# Patient Record
Sex: MALE | Race: BLACK OR AFRICAN AMERICAN | Employment: FULL TIME | URBAN - METROPOLITAN AREA
[De-identification: names, ages, dates, MRNs, and addresses within clinical notes are randomized per-mention and may not be internally consistent; named-entity substitution may affect disease eponyms.]

---

## 2021-03-18 ENCOUNTER — APPOINTMENT (OUTPATIENT)
Dept: GENERAL RADIOLOGY | Age: 56
DRG: 177 | End: 2021-03-18
Attending: EMERGENCY MEDICINE
Payer: COMMERCIAL

## 2021-03-18 ENCOUNTER — HOSPITAL ENCOUNTER (INPATIENT)
Age: 56
LOS: 3 days | Discharge: HOME | DRG: 177 | End: 2021-03-21
Attending: EMERGENCY MEDICINE | Admitting: INTERNAL MEDICINE
Payer: COMMERCIAL

## 2021-03-18 ENCOUNTER — APPOINTMENT (OUTPATIENT)
Dept: CT IMAGING | Age: 56
DRG: 177 | End: 2021-03-18
Attending: PHYSICIAN ASSISTANT
Payer: COMMERCIAL

## 2021-03-18 DIAGNOSIS — U07.1 PNEUMONIA DUE TO COVID-19 VIRUS: Primary | ICD-10-CM

## 2021-03-18 DIAGNOSIS — J12.82 PNEUMONIA DUE TO COVID-19 VIRUS: Primary | ICD-10-CM

## 2021-03-18 DIAGNOSIS — N17.9 AKI (ACUTE KIDNEY INJURY) (HCC): ICD-10-CM

## 2021-03-18 DIAGNOSIS — I95.1 ORTHOSTATIC HYPOTENSION: ICD-10-CM

## 2021-03-18 PROBLEM — R55 SYNCOPE: Status: ACTIVE | Noted: 2021-03-18

## 2021-03-18 LAB
ALBUMIN SERPL-MCNC: 2.6 G/DL (ref 3.5–5)
ALBUMIN/GLOB SERPL: 0.6 {RATIO} (ref 1.1–2.2)
ALP SERPL-CCNC: 82 U/L (ref 45–117)
ALT SERPL-CCNC: 173 U/L (ref 12–78)
ANION GAP SERPL CALC-SCNC: 4 MMOL/L (ref 5–15)
APPEARANCE UR: CLEAR
AST SERPL-CCNC: 127 U/L (ref 15–37)
ATRIAL RATE: 81 BPM
BACTERIA URNS QL MICRO: NEGATIVE /HPF
BASOPHILS # BLD: 0 K/UL (ref 0–0.1)
BASOPHILS NFR BLD: 0 % (ref 0–1)
BILIRUB SERPL-MCNC: 1.2 MG/DL (ref 0.2–1)
BILIRUB UR QL: NEGATIVE
BUN SERPL-MCNC: 15 MG/DL (ref 6–20)
BUN/CREAT SERPL: 11 (ref 12–20)
CALCIUM SERPL-MCNC: 7.7 MG/DL (ref 8.5–10.1)
CALCULATED P AXIS, ECG09: 45 DEGREES
CALCULATED R AXIS, ECG10: 41 DEGREES
CALCULATED T AXIS, ECG11: 42 DEGREES
CHLORIDE SERPL-SCNC: 102 MMOL/L (ref 97–108)
CO2 SERPL-SCNC: 26 MMOL/L (ref 21–32)
COLOR UR: ABNORMAL
COVID-19 RAPID TEST, COVR: DETECTED
CREAT SERPL-MCNC: 1.39 MG/DL (ref 0.7–1.3)
CRP SERPL HS-MCNC: >9.5 MG/L
D DIMER PPP FEU-MCNC: 2.36 MG/L FEU (ref 0–0.65)
DIAGNOSIS, 93000: NORMAL
DIFFERENTIAL METHOD BLD: ABNORMAL
EOSINOPHIL # BLD: 0 K/UL (ref 0–0.4)
EOSINOPHIL NFR BLD: 0 % (ref 0–7)
EPITH CASTS URNS QL MICRO: ABNORMAL /LPF
ERYTHROCYTE [DISTWIDTH] IN BLOOD BY AUTOMATED COUNT: 13.4 % (ref 11.5–14.5)
FERRITIN SERPL-MCNC: ABNORMAL NG/ML (ref 26–388)
FIBRINOGEN PPP-MCNC: 681 MG/DL (ref 200–475)
GLOBULIN SER CALC-MCNC: 4.6 G/DL (ref 2–4)
GLUCOSE SERPL-MCNC: 106 MG/DL (ref 65–100)
GLUCOSE UR STRIP.AUTO-MCNC: NEGATIVE MG/DL
HCT VFR BLD AUTO: 35.2 % (ref 36.6–50.3)
HGB BLD-MCNC: 12.9 G/DL (ref 12.1–17)
HGB UR QL STRIP: ABNORMAL
IMM GRANULOCYTES # BLD AUTO: 0.1 K/UL (ref 0–0.04)
IMM GRANULOCYTES NFR BLD AUTO: 2 % (ref 0–0.5)
INR PPP: 1.1 (ref 0.9–1.1)
KETONES UR QL STRIP.AUTO: NEGATIVE MG/DL
LACTATE BLD-SCNC: 1.99 MMOL/L (ref 0.4–2)
LDH SERPL L TO P-CCNC: 871 U/L (ref 85–241)
LEUKOCYTE ESTERASE UR QL STRIP.AUTO: NEGATIVE
LYMPHOCYTES # BLD: 0.9 K/UL (ref 0.8–3.5)
LYMPHOCYTES NFR BLD: 10 % (ref 12–49)
MCH RBC QN AUTO: 29.5 PG (ref 26–34)
MCHC RBC AUTO-ENTMCNC: 36.6 G/DL (ref 30–36.5)
MCV RBC AUTO: 80.5 FL (ref 80–99)
MONOCYTES # BLD: 1 K/UL (ref 0–1)
MONOCYTES NFR BLD: 12 % (ref 5–13)
NEUTS SEG # BLD: 6.3 K/UL (ref 1.8–8)
NEUTS SEG NFR BLD: 76 % (ref 32–75)
NITRITE UR QL STRIP.AUTO: NEGATIVE
NRBC # BLD: 0 K/UL (ref 0–0.01)
NRBC BLD-RTO: 0 PER 100 WBC
P-R INTERVAL, ECG05: 170 MS
PH UR STRIP: 6.5 [PH] (ref 5–8)
PLATELET # BLD AUTO: 331 K/UL (ref 150–400)
PMV BLD AUTO: 10.5 FL (ref 8.9–12.9)
POTASSIUM SERPL-SCNC: 4.1 MMOL/L (ref 3.5–5.1)
PROT SERPL-MCNC: 7.2 G/DL (ref 6.4–8.2)
PROT UR STRIP-MCNC: 100 MG/DL
PROTHROMBIN TIME: 11.9 SEC (ref 9–11.1)
Q-T INTERVAL, ECG07: 334 MS
QRS DURATION, ECG06: 88 MS
QTC CALCULATION (BEZET), ECG08: 387 MS
RBC # BLD AUTO: 4.37 M/UL (ref 4.1–5.7)
RBC #/AREA URNS HPF: ABNORMAL /HPF (ref 0–5)
SODIUM SERPL-SCNC: 132 MMOL/L (ref 136–145)
SOURCE, COVRS: ABNORMAL
SP GR UR REFRACTOMETRY: 1.01 (ref 1–1.03)
TROPONIN I SERPL-MCNC: <0.05 NG/ML
TROPONIN I SERPL-MCNC: <0.05 NG/ML
UA: UC IF INDICATED,UAUC: ABNORMAL
UROBILINOGEN UR QL STRIP.AUTO: 1 EU/DL (ref 0.2–1)
VENTRICULAR RATE, ECG03: 81 BPM
WBC # BLD AUTO: 8.3 K/UL (ref 4.1–11.1)
WBC URNS QL MICRO: ABNORMAL /HPF (ref 0–4)

## 2021-03-18 PROCEDURE — 96361 HYDRATE IV INFUSION ADD-ON: CPT

## 2021-03-18 PROCEDURE — 94761 N-INVAS EAR/PLS OXIMETRY MLT: CPT

## 2021-03-18 PROCEDURE — 65660000000 HC RM CCU STEPDOWN

## 2021-03-18 PROCEDURE — 83615 LACTATE (LD) (LDH) ENZYME: CPT

## 2021-03-18 PROCEDURE — 74011250637 HC RX REV CODE- 250/637: Performed by: HOSPITALIST

## 2021-03-18 PROCEDURE — 86141 C-REACTIVE PROTEIN HS: CPT

## 2021-03-18 PROCEDURE — 96375 TX/PRO/DX INJ NEW DRUG ADDON: CPT

## 2021-03-18 PROCEDURE — 74011250636 HC RX REV CODE- 250/636: Performed by: EMERGENCY MEDICINE

## 2021-03-18 PROCEDURE — 96374 THER/PROPH/DIAG INJ IV PUSH: CPT

## 2021-03-18 PROCEDURE — 87040 BLOOD CULTURE FOR BACTERIA: CPT

## 2021-03-18 PROCEDURE — 71275 CT ANGIOGRAPHY CHEST: CPT

## 2021-03-18 PROCEDURE — 71045 X-RAY EXAM CHEST 1 VIEW: CPT

## 2021-03-18 PROCEDURE — 85384 FIBRINOGEN ACTIVITY: CPT

## 2021-03-18 PROCEDURE — 93005 ELECTROCARDIOGRAM TRACING: CPT

## 2021-03-18 PROCEDURE — 74011250636 HC RX REV CODE- 250/636: Performed by: HOSPITALIST

## 2021-03-18 PROCEDURE — 85610 PROTHROMBIN TIME: CPT

## 2021-03-18 PROCEDURE — 87635 SARS-COV-2 COVID-19 AMP PRB: CPT

## 2021-03-18 PROCEDURE — 85025 COMPLETE CBC W/AUTO DIFF WBC: CPT

## 2021-03-18 PROCEDURE — 74011000250 HC RX REV CODE- 250: Performed by: HOSPITALIST

## 2021-03-18 PROCEDURE — 99285 EMERGENCY DEPT VISIT HI MDM: CPT

## 2021-03-18 PROCEDURE — 83605 ASSAY OF LACTIC ACID: CPT

## 2021-03-18 PROCEDURE — 74011250637 HC RX REV CODE- 250/637: Performed by: EMERGENCY MEDICINE

## 2021-03-18 PROCEDURE — 80053 COMPREHEN METABOLIC PANEL: CPT

## 2021-03-18 PROCEDURE — 85379 FIBRIN DEGRADATION QUANT: CPT

## 2021-03-18 PROCEDURE — 81001 URINALYSIS AUTO W/SCOPE: CPT

## 2021-03-18 PROCEDURE — 74011000258 HC RX REV CODE- 258: Performed by: HOSPITALIST

## 2021-03-18 PROCEDURE — XW033E5 INTRODUCTION OF REMDESIVIR ANTI-INFECTIVE INTO PERIPHERAL VEIN, PERCUTANEOUS APPROACH, NEW TECHNOLOGY GROUP 5: ICD-10-PCS | Performed by: INTERNAL MEDICINE

## 2021-03-18 PROCEDURE — 36415 COLL VENOUS BLD VENIPUNCTURE: CPT

## 2021-03-18 PROCEDURE — 74011000636 HC RX REV CODE- 636: Performed by: EMERGENCY MEDICINE

## 2021-03-18 PROCEDURE — 84484 ASSAY OF TROPONIN QUANT: CPT

## 2021-03-18 PROCEDURE — 74011250636 HC RX REV CODE- 250/636: Performed by: PHYSICIAN ASSISTANT

## 2021-03-18 PROCEDURE — 82728 ASSAY OF FERRITIN: CPT

## 2021-03-18 RX ORDER — DEXAMETHASONE SODIUM PHOSPHATE 4 MG/ML
6 INJECTION, SOLUTION INTRA-ARTICULAR; INTRALESIONAL; INTRAMUSCULAR; INTRAVENOUS; SOFT TISSUE
Status: COMPLETED | OUTPATIENT
Start: 2021-03-18 | End: 2021-03-18

## 2021-03-18 RX ORDER — ACETAMINOPHEN 650 MG/1
650 SUPPOSITORY RECTAL
Status: DISCONTINUED | OUTPATIENT
Start: 2021-03-18 | End: 2021-03-20 | Stop reason: SDUPTHER

## 2021-03-18 RX ORDER — IBUPROFEN 200 MG
200 TABLET ORAL
COMMUNITY
End: 2021-03-21

## 2021-03-18 RX ORDER — SODIUM CHLORIDE 0.9 % (FLUSH) 0.9 %
5-10 SYRINGE (ML) INJECTION AS NEEDED
Status: DISCONTINUED | OUTPATIENT
Start: 2021-03-18 | End: 2021-03-21 | Stop reason: HOSPADM

## 2021-03-18 RX ORDER — ZINC SULFATE 50(220)MG
1 CAPSULE ORAL EVERY 12 HOURS
Status: DISCONTINUED | OUTPATIENT
Start: 2021-03-18 | End: 2021-03-21 | Stop reason: HOSPADM

## 2021-03-18 RX ORDER — ENOXAPARIN SODIUM 100 MG/ML
40 INJECTION SUBCUTANEOUS DAILY
Status: DISCONTINUED | OUTPATIENT
Start: 2021-03-18 | End: 2021-03-21

## 2021-03-18 RX ORDER — ACETAMINOPHEN 500 MG
1000 TABLET ORAL ONCE
Status: COMPLETED | OUTPATIENT
Start: 2021-03-18 | End: 2021-03-18

## 2021-03-18 RX ORDER — POLYETHYLENE GLYCOL 3350 17 G/17G
17 POWDER, FOR SOLUTION ORAL DAILY PRN
Status: DISCONTINUED | OUTPATIENT
Start: 2021-03-18 | End: 2021-03-21 | Stop reason: HOSPADM

## 2021-03-18 RX ORDER — SODIUM CHLORIDE 9 MG/ML
100 INJECTION, SOLUTION INTRAVENOUS CONTINUOUS
Status: DISCONTINUED | OUTPATIENT
Start: 2021-03-18 | End: 2021-03-19

## 2021-03-18 RX ORDER — MELATONIN
2000 DAILY
Status: DISCONTINUED | OUTPATIENT
Start: 2021-03-18 | End: 2021-03-21 | Stop reason: HOSPADM

## 2021-03-18 RX ORDER — ACETAMINOPHEN 325 MG/1
650 TABLET ORAL
Status: DISCONTINUED | OUTPATIENT
Start: 2021-03-18 | End: 2021-03-20 | Stop reason: SDUPTHER

## 2021-03-18 RX ORDER — ASCORBIC ACID 500 MG
500 TABLET ORAL 2 TIMES DAILY
Status: DISCONTINUED | OUTPATIENT
Start: 2021-03-18 | End: 2021-03-21 | Stop reason: HOSPADM

## 2021-03-18 RX ORDER — PROMETHAZINE HYDROCHLORIDE 25 MG/1
12.5 TABLET ORAL
Status: DISCONTINUED | OUTPATIENT
Start: 2021-03-18 | End: 2021-03-21 | Stop reason: HOSPADM

## 2021-03-18 RX ORDER — ACETAMINOPHEN 500 MG
500 TABLET ORAL
COMMUNITY

## 2021-03-18 RX ORDER — ONDANSETRON 2 MG/ML
4 INJECTION INTRAMUSCULAR; INTRAVENOUS
Status: DISCONTINUED | OUTPATIENT
Start: 2021-03-18 | End: 2021-03-21 | Stop reason: HOSPADM

## 2021-03-18 RX ORDER — SODIUM CHLORIDE 0.9 % (FLUSH) 0.9 %
5-40 SYRINGE (ML) INJECTION AS NEEDED
Status: DISCONTINUED | OUTPATIENT
Start: 2021-03-18 | End: 2021-03-21 | Stop reason: HOSPADM

## 2021-03-18 RX ORDER — DEXAMETHASONE SODIUM PHOSPHATE 4 MG/ML
6 INJECTION, SOLUTION INTRA-ARTICULAR; INTRALESIONAL; INTRAMUSCULAR; INTRAVENOUS; SOFT TISSUE EVERY 24 HOURS
Status: DISCONTINUED | OUTPATIENT
Start: 2021-03-19 | End: 2021-03-21 | Stop reason: HOSPADM

## 2021-03-18 RX ORDER — ALBUTEROL SULFATE 90 UG/1
2 AEROSOL, METERED RESPIRATORY (INHALATION)
Status: DISCONTINUED | OUTPATIENT
Start: 2021-03-18 | End: 2021-03-21 | Stop reason: HOSPADM

## 2021-03-18 RX ORDER — SODIUM CHLORIDE 0.9 % (FLUSH) 0.9 %
5-40 SYRINGE (ML) INJECTION EVERY 8 HOURS
Status: DISCONTINUED | OUTPATIENT
Start: 2021-03-18 | End: 2021-03-21 | Stop reason: HOSPADM

## 2021-03-18 RX ADMIN — OXYCODONE HYDROCHLORIDE AND ACETAMINOPHEN 500 MG: 500 TABLET ORAL at 09:03

## 2021-03-18 RX ADMIN — IOPAMIDOL 60 ML: 755 INJECTION, SOLUTION INTRAVENOUS at 04:08

## 2021-03-18 RX ADMIN — SODIUM CHLORIDE 150 ML/HR: 9 INJECTION, SOLUTION INTRAVENOUS at 09:06

## 2021-03-18 RX ADMIN — Medication 10 ML: at 21:18

## 2021-03-18 RX ADMIN — SODIUM CHLORIDE 1000 ML: 9 INJECTION, SOLUTION INTRAVENOUS at 03:43

## 2021-03-18 RX ADMIN — SODIUM CHLORIDE 150 ML/HR: 9 INJECTION, SOLUTION INTRAVENOUS at 21:17

## 2021-03-18 RX ADMIN — ZINC SULFATE 220 MG (50 MG) CAPSULE 1 CAPSULE: CAPSULE at 09:03

## 2021-03-18 RX ADMIN — ZINC SULFATE 220 MG (50 MG) CAPSULE 1 CAPSULE: CAPSULE at 21:17

## 2021-03-18 RX ADMIN — Medication 10 ML: at 14:00

## 2021-03-18 RX ADMIN — Medication 10 ML: at 09:13

## 2021-03-18 RX ADMIN — REMDESIVIR 200 MG: 100 INJECTION, POWDER, LYOPHILIZED, FOR SOLUTION INTRAVENOUS at 10:53

## 2021-03-18 RX ADMIN — DEXAMETHASONE SODIUM PHOSPHATE 6 MG: 4 INJECTION, SOLUTION INTRAMUSCULAR; INTRAVENOUS at 23:12

## 2021-03-18 RX ADMIN — SODIUM CHLORIDE 1000 ML: 9 INJECTION, SOLUTION INTRAVENOUS at 03:44

## 2021-03-18 RX ADMIN — ACETAMINOPHEN 1000 MG: 500 TABLET, FILM COATED ORAL at 03:44

## 2021-03-18 RX ADMIN — OXYCODONE HYDROCHLORIDE AND ACETAMINOPHEN 500 MG: 500 TABLET ORAL at 18:01

## 2021-03-18 RX ADMIN — AZITHROMYCIN 500 MG: 500 INJECTION, POWDER, LYOPHILIZED, FOR SOLUTION INTRAVENOUS at 04:47

## 2021-03-18 RX ADMIN — Medication 2000 UNITS: at 09:03

## 2021-03-18 RX ADMIN — CEFTRIAXONE 1 G: 1 INJECTION, POWDER, FOR SOLUTION INTRAMUSCULAR; INTRAVENOUS at 09:06

## 2021-03-18 RX ADMIN — ENOXAPARIN SODIUM 40 MG: 40 INJECTION SUBCUTANEOUS at 09:05

## 2021-03-18 RX ADMIN — DEXAMETHASONE SODIUM PHOSPHATE 6 MG: 4 INJECTION, SOLUTION INTRAMUSCULAR; INTRAVENOUS at 04:06

## 2021-03-18 RX ADMIN — SODIUM CHLORIDE 667 ML: 9 INJECTION, SOLUTION INTRAVENOUS at 03:44

## 2021-03-18 NOTE — ED NOTES
TRANSFER - IN REPORT:    Verbal report received from Aileen on Mira Queen for routine progression of care      Report consisted of patients Situation, Background, Assessment and   Recommendations(SBAR). Information from the following report(s) SBAR, ED Summary, [facility] and Recent Results was reviewed with the receiving nurse. Opportunity for questions and clarification was provided. Assessment completed upon patients arrival to unit and care assumed. 0900: PCT at bedside obtaining blood sample. This RN performed orthostatics (see flowsheet). Pt BP was orthostatic in the sitting position, due to recent hx of syncope, this RN did not obtain a standing BP. Pt denied any dizziness, HA or vision changes at the time. Pt medicated per orders. Pt sitting on edge of bed to eat breakfast w/ call bell in reach. Pt educated not to get out of bed without calling for assistance. 1050: pt resting comfortably in bed. Pt medicated per orders. 1218: This RN responded to a pump alarm. Pt sitting, without distress on the edge of the bed. Pt felt diaphoretic, oral temperature checked and pt afebrile at 97.9. This Rn adjusted room temperature for comfort. 1355: TRANSFER - OUT REPORT:    Verbal report attempted to be given [place] on Mira Queen  being transferred to PCU for routine progression of care. RN unable to take reportat this time. RN stated she would call back. Pt transport on the floor to transport pt. Report consisted of patients Situation, Background, Assessment and   Recommendations(SBAR). Information from the following report(s) SBAR, ED Summary, [facility] and Recent Results was reviewed with the receiving nurse.     Lines:   Peripheral IV 03/18/21 Right Antecubital (Active)       Peripheral IV 03/18/21 Left Antecubital (Active)   Site Assessment Clean, dry, & intact 03/18/21 0914   Phlebitis Assessment 0 03/18/21 0914   Infiltration Assessment 0 03/18/21 0914   Dressing Status Clean, dry, & intact 03/18/21 0914   Dressing Type Transparent 03/18/21 0914   Hub Color/Line Status Pink;Flushed 03/18/21 0914   Action Taken Blood drawn 03/18/21 0914   Alcohol Cap Used No 03/18/21 0914        Opportunity for questions and clarification was provided.       Patient transported with:   Transport

## 2021-03-18 NOTE — ED NOTES
Assumed care of patient from triage w c/o a syncopal episode that occurred at home. Patient reports he was diagnosed w Covid on 3/2/2021. Denies any pain at this time. Patient placed on the monitor x 4 and in no apparent distress. 5628- Patient ambulated around room per order by PA. Patient maintained 02 on RA at 92 %.

## 2021-03-18 NOTE — ED PROVIDER NOTES
EMERGENCY DEPARTMENT HISTORY AND PHYSICAL EXAM      Date: 3/18/2021  Patient Name: Mica Balderrama    History of Presenting Illness     Chief Complaint   Patient presents with    Syncope     x3 syncolpal episodes over last week. History Provided By: Patient    HPI: Mica Balderrama, 49 y.o. male without significant PMHx, presents by EMS to the ED with cc of following a syncopal episode that occurred this evening PTA. The patient reports that he was diagnosed with COVID-19 on 3/2/2021. At onset he just had a cough, congestion, headache, and fever. Since then the congestion and headache have resolved and his fevers have decreased in severity. However, he has developed new symptom of exertional shortness of breath. Additionally over the last 5 days he has had 3 syncopal episodes. Each of these syncopal episodes has occurred shortly after standing up. He also notes a decreased appetite and nausea without vomiting. He denies loss of sensation of smell or loss of sensation of taste. He has no abdominal pain or urinary complaints. He has been taking over-the-counter antipyretics and cough cold remedies with some relief of symptoms. There are no other complaints, changes, or physical findings at this time. Social Hx: Tobacco (denies), EtOH (denies), Illicit drug use (denies)     PCP: Jessica Tellez MD    No current facility-administered medications on file prior to encounter. No current outpatient medications on file prior to encounter. Past History     Past Medical History:  History reviewed. No pertinent past medical history. Past Surgical History:  History reviewed. No pertinent surgical history. Family History:  History reviewed. No pertinent family history.     Social History:  Social History     Tobacco Use    Smoking status: Never Smoker    Smokeless tobacco: Never Used   Substance Use Topics    Alcohol use: Not Currently    Drug use: Not Currently       Allergies:  No Known Allergies      Review of Systems   Review of Systems   Constitutional: Positive for appetite change, chills and fever. Negative for diaphoresis. HENT: Positive for congestion. Negative for ear pain, rhinorrhea and sore throat. Respiratory: Positive for cough and shortness of breath. Cardiovascular: Negative for chest pain. Gastrointestinal: Positive for nausea. Negative for abdominal pain, constipation, diarrhea and vomiting. Genitourinary: Negative for difficulty urinating, dysuria, frequency and hematuria. Musculoskeletal: Negative for arthralgias and myalgias. Neurological: Positive for syncope and headaches. Negative for dizziness and weakness. All other systems reviewed and are negative. Physical Exam   Physical Exam  Vitals signs and nursing note reviewed. Constitutional:       General: He is not in acute distress. Appearance: He is well-developed. He is not diaphoretic. Comments: 49 y.o. -American male    HENT:      Head: Normocephalic and atraumatic. Eyes:      General:         Right eye: No discharge. Left eye: No discharge. Conjunctiva/sclera: Conjunctivae normal.   Neck:      Musculoskeletal: Normal range of motion and neck supple. Cardiovascular:      Rate and Rhythm: Normal rate. Pulmonary:      Effort: Pulmonary effort is normal.   Skin:     General: Skin is warm and dry. Neurological:      Mental Status: He is alert and oriented to person, place, and time.    Psychiatric:         Behavior: Behavior normal.         Diagnostic Study Results     Labs -     Recent Results (from the past 12 hour(s))   EKG, 12 LEAD, INITIAL    Collection Time: 03/18/21 12:45 AM   Result Value Ref Range    Ventricular Rate 81 BPM    Atrial Rate 81 BPM    P-R Interval 170 ms    QRS Duration 88 ms    Q-T Interval 334 ms    QTC Calculation (Bezet) 387 ms    Calculated P Axis 45 degrees    Calculated R Axis 41 degrees    Calculated T Axis 42 degrees    Diagnosis Normal sinus rhythm  Voltage criteria for left ventricular hypertrophy  Nonspecific ST and T wave abnormality  No previous ECGs available     CBC WITH AUTOMATED DIFF    Collection Time: 03/18/21 12:56 AM   Result Value Ref Range    WBC 8.3 4.1 - 11.1 K/uL    RBC 4.37 4.10 - 5.70 M/uL    HGB 12.9 12.1 - 17.0 g/dL    HCT 35.2 (L) 36.6 - 50.3 %    MCV 80.5 80.0 - 99.0 FL    MCH 29.5 26.0 - 34.0 PG    MCHC 36.6 (H) 30.0 - 36.5 g/dL    RDW 13.4 11.5 - 14.5 %    PLATELET 660 850 - 592 K/uL    MPV 10.5 8.9 - 12.9 FL    NRBC 0.0 0  WBC    ABSOLUTE NRBC 0.00 0.00 - 0.01 K/uL    NEUTROPHILS 76 (H) 32 - 75 %    LYMPHOCYTES 10 (L) 12 - 49 %    MONOCYTES 12 5 - 13 %    EOSINOPHILS 0 0 - 7 %    BASOPHILS 0 0 - 1 %    IMMATURE GRANULOCYTES 2 (H) 0.0 - 0.5 %    ABS. NEUTROPHILS 6.3 1.8 - 8.0 K/UL    ABS. LYMPHOCYTES 0.9 0.8 - 3.5 K/UL    ABS. MONOCYTES 1.0 0.0 - 1.0 K/UL    ABS. EOSINOPHILS 0.0 0.0 - 0.4 K/UL    ABS. BASOPHILS 0.0 0.0 - 0.1 K/UL    ABS. IMM. GRANS. 0.1 (H) 0.00 - 0.04 K/UL    DF AUTOMATED     METABOLIC PANEL, COMPREHENSIVE    Collection Time: 03/18/21 12:56 AM   Result Value Ref Range    Sodium 132 (L) 136 - 145 mmol/L    Potassium 4.1 3.5 - 5.1 mmol/L    Chloride 102 97 - 108 mmol/L    CO2 26 21 - 32 mmol/L    Anion gap 4 (L) 5 - 15 mmol/L    Glucose 106 (H) 65 - 100 mg/dL    BUN 15 6 - 20 MG/DL    Creatinine 1.39 (H) 0.70 - 1.30 MG/DL    BUN/Creatinine ratio 11 (L) 12 - 20      GFR est AA >60 >60 ml/min/1.73m2    GFR est non-AA 53 (L) >60 ml/min/1.73m2    Calcium 7.7 (L) 8.5 - 10.1 MG/DL    Bilirubin, total 1.2 (H) 0.2 - 1.0 MG/DL    ALT (SGPT) 173 (H) 12 - 78 U/L    AST (SGOT) 127 (H) 15 - 37 U/L    Alk.  phosphatase 82 45 - 117 U/L    Protein, total 7.2 6.4 - 8.2 g/dL    Albumin 2.6 (L) 3.5 - 5.0 g/dL    Globulin 4.6 (H) 2.0 - 4.0 g/dL    A-G Ratio 0.6 (L) 1.1 - 2.2     URINALYSIS W/ REFLEX CULTURE    Collection Time: 03/18/21 12:56 AM    Specimen: Urine   Result Value Ref Range    Color YELLOW/STRAW Appearance CLEAR CLEAR      Specific gravity 1.012 1.003 - 1.030      pH (UA) 6.5 5.0 - 8.0      Protein 100 (A) NEG mg/dL    Glucose Negative NEG mg/dL    Ketone Negative NEG mg/dL    Bilirubin Negative NEG      Blood MODERATE (A) NEG      Urobilinogen 1.0 0.2 - 1.0 EU/dL    Nitrites Negative NEG      Leukocyte Esterase Negative NEG      WBC 5-10 0 - 4 /hpf    RBC 0-5 0 - 5 /hpf    Epithelial cells FEW FEW /lpf    Bacteria Negative NEG /hpf    UA:UC IF INDICATED CULTURE NOT INDICATED BY UA RESULT CNI     TROPONIN I    Collection Time: 03/18/21 12:56 AM   Result Value Ref Range    Troponin-I, Qt. <0.05 <0.05 ng/mL   POC LACTIC ACID    Collection Time: 03/18/21  1:39 AM   Result Value Ref Range    Lactic Acid (POC) 1.99 0.40 - 2.00 mmol/L   COVID-19 RAPID TEST    Collection Time: 03/18/21  2:55 AM   Result Value Ref Range    Specimen source Please find results under separate order      COVID-19 rapid test Detected (AA) NOTD         Radiologic Studies -     CT Results  (Last 48 hours)               03/18/21 0408  CTA CHEST W OR W WO CONT Final result    Impression:  No evidence for pulmonary embolism. Bilateral pneumonia. Narrative:  History: Shortness of breath. Covid. CTA of the chest was performed. 60 mL Isovue-370 were injected and scanning was   performed during the arterial phase of contrast administration. Post processing   was performed. 3D reconstructions were performed. CT dose reduction was   achieved through use of a standardized protocol tailored for this examination   and automatic exposure control for dose modulation. There are no intraluminal filling defects to suggest pulmonary embolism. The   heart, pericardium, and great vessels appear unremarkable. The chest wall and   axilla appear unremarkable. There is opacification throughout the lungs   bilaterally, greater on the right. The osseous structures appear unremarkable.                CXR Results  (Last 48 hours) 03/18/21 0206  XR CHEST PORT Final result    Impression:  Bilateral pneumonia, right greater than left. Narrative:  EXAM: XR CHEST PORT       INDICATION: Eval for Infiltrate       COMPARISON: None. FINDINGS: A portable AP radiograph of the chest was obtained at 0059 hours. The   patient is on a cardiac monitor. There is diffuse opacification throughout the   right lung and airspace disease in the left lower lobe. The cardiac and   mediastinal contours and pulmonary vascularity are normal.  The bones and soft   tissues are grossly within normal limits. There is distention of the bowel in   the abdomen. Medical Decision Making   I am the first provider for this patient. I reviewed the vital signs, available nursing notes, past medical history, past surgical history, family history and social history. Vital Signs-Reviewed the patient's vital signs. Patient Vitals for the past 12 hrs:   Temp Pulse Resp BP SpO2   03/18/21 0617 100.3 °F (37.9 °C)       03/18/21 0615  80 (!) 31 111/83 90 %   03/18/21 0613  82 26 (!) 67/42 (!) 86 %   03/18/21 0611  71 29 116/88 90 %   03/18/21 0609  70 (!) 34 125/82 (!) 89 %   03/18/21 0607  69  125/82    03/18/21 0530  74 30 125/86 93 %   03/18/21 0500  75 25 123/81 94 %   03/18/21 0407 (!) 100.8 °F (38.2 °C) 82 (!) 33 114/68 94 %   03/18/21 0039 (!) 100.8 °F (38.2 °C) 90 18 116/65 96 %       Records Reviewed: Nursing Notes   The patient has no prior ED records in Epic to review. Provider Notes (Medical Decision Making):   Patient presents the ED for syncopal episode. He has a known diagnosis of Covid on arrival.  X-ray shows bilateral pneumonia. Patient's respiratory rate at times is high. He also remains with orthostatic hypotension after over 2 L of fluid provided. Also of note he has an acute kidney injury with a creatinine bump to 1.39. Will require admission to the hospital for further management.     ED Course: Initial assessment performed. The patients presenting problems have been discussed, and they are in agreement with the care plan formulated and outlined with them. I have encouraged them to ask questions as they arise throughout their visit. 3:07 AM  Case discussed with Meka Miranda MD who will evaluate the patient. 6:16 AM  Patient orthostatic after IV fluid bolus and symptomatic with systolic BP in 76'P. Will call hospitalist to admit. CONSULT NOTE:  6:21 AM  KENNEDY Hatch spoke with Dr. Dena Gresham, Consult for Hospitalist. Discussed available diagnostic tests and clinical findings. He is in agreement with care plans as outlined. He/she will see and admit the patient to the hospital.      6:25 AM  The patient has been informed and is in agreement with his admission to the hospital.          Critical Care Time: None    Disposition:  Admit Note:  6:21 AM  Pt is being admitted by Internal Medicine. The results of their tests and reason(s) for their admission have been discussed with pt and/or available family. They convey agreement and understanding for the need to be admitted and for admission diagnosis. PLAN:  Admit    Diagnosis     Clinical Impression:   1. Pneumonia due to COVID-19 virus    2. Orthostatic hypotension    3. BETHANY (acute kidney injury) [facility])          Please note that this dictation was completed with [email or web address], the computer voice recognition software. Quite often unanticipated grammatical, syntax, homophones, and other interpretive errors are inadvertently transcribed by the computer software. Please disregards these errors. Please excuse any errors that have escaped final proofreading.

## 2021-03-18 NOTE — PROGRESS NOTES
1417 TRANSFER - IN REPORT:    Verbal report received from Carmenza Oleary (name) on Gilbert Jimenez  being received from ed(unit) for routine progression of care      Report consisted of patients Situation, Background, Assessment and   Recommendations(SBAR). Information from the following report(s) SBAR, Kardex, ED Summary, Intake/Output, MAR, Recent Results, Cardiac Rhythm NSR and Alarm Parameters  was reviewed with the receiving nurse. Opportunity for questions and clarification was provided. Assessment completed upon patients arrival to unit and care assumed. Primary Nurse Jamal Ellis and JEANA Mcknight performed a dual skin assessment on this patient No impairment noted  Russell score is 21    1900 End of Shift Note    Bedside shift change report given to JEANA ZAYAS(oncoming nurse) by Jamal Ellis (offgoing nurse). Report included the following information SBAR, Kardex, ED Summary, Intake/Output, MAR, Recent Results and Cardiac Rhythm NSR    Shift worked: 7A-7P     Shift summary and any significant changes:     none     Concerns for physician to address: none     Zone phone for oncoming shift:  n/a       Activity:  Activity Level: Up with Assistance  Number times ambulated in hallways past shift: 0  Number of times OOB to chair past shift: 0    Cardiac:   Cardiac Monitoring: Yes      Cardiac Rhythm: Normal sinus rhythm    Access:   Current line(s): PIV     Genitourinary:   Urinary status: voiding    Respiratory:   O2 Device: Room air  Chronic home O2 use?: NO  Incentive spirometer at bedside: NO     GI:     Current diet:  DIET REGULAR  Passing flatus: YES  Tolerating current diet: YES       Pain Management:   Patient states pain is manageable on current regimen: N/A    Skin:  Russell Score: 21  Interventions: increase time out of bed    Patient Safety:  Fall Score:  Total Score: 3  Interventions: gripper socks, pt to call before getting OOB and stay with me (per policy)  High Fall Risk: Yes    Length of Stay:  Expected LOS: - - -  Actual LOS: 0      Acacia Tena

## 2021-03-18 NOTE — H&P
Hospitalist Admission Note    NAME: Maru Rodrigues   :  1965   MRN:  [id number]     Date/Time:  3/18/2021 7:12 AM    Patient PCP: Lakisha Mcnally MD    Please note that this dictation was completed with [employer], the computer voice recognition software. Quite often unanticipated grammatical, syntax, homophones, and other interpretive errors are inadvertently transcribed by the computer software. Please disregard these errors. Please excuse any errors that have escaped final proofreading  ______________________________________________________________________   Assessment & Plan:  Acute hypoxic respiratory failure, POA due to  Bilateral covid pneumonia, POA  Acute hepatitis due to covid-19 infection  --CTA chest negative for PE, + b/l pneumonia R>L. Rapid covid +. Tested + outside on 3/2/21 but now with increasing XIONG  --86, 89% RA at times at rest.  Put on 2L O2  --continue decadron 6mg x 10 days total  --continue azithromycin and add rocephin for total 5 days  --start remdesivir, monitor LFTs  --vitamin C, D, zinc for 1 week  --check inflammatory marks:  LDH, ferritin, fibrinogen, crp, d-dimer    Syncope x 2 due to orthostatic hypotension  BETHANY, POA Cr 1.39 and hyponatremia due to dehydration  --given 2L in ER but orthostatic down to 67/42 with standing  --continue IVF 150ml/hr.  --up with assistance only. Monitor orthostatics bid and stop IVF when orthostatics resolved  --repeat troponin    Body mass index is 26.58 kg/m². Code: full  DVT prophylaxis: lovenox  Surrogate decision maker:  Wife Dedra Howard          Subjective:   CHIEF COMPLAINT:  syncope    HISTORY OF PRESENT ILLNESS:     Maru Rodrigues is a 49 y.o. male without significant PMHx, presented after syncope last evening. Felt lightheaded and dizzy prior to passing out. Diagnosed with covid on 3/2/21 along with his wife. Initially had cough, chest congestion, headache, fever.   Been taking tylenol alternating with Motrin ATC for fever. For past week been having increasing SOB with exertion. Had 2 more episodes of syncope within past 5 days. +decreased appetite and nausea, no vomiting or diarrhea. In ER, febrile 100.8, pulse 90, RR mid 20 to 30s. Given 2L IVF but orthostatic down to 67/42 still. We were asked to admit for work up and evaluation of the above problems. History reviewed. No pertinent past medical history. Past Surgical History:   Procedure Laterality Date    HX COLONOSCOPY  2019    HX KNEE ARTHROSCOPY Left      Social History     Tobacco Use    Smoking status: Never Smoker    Smokeless tobacco: Never Used   Substance Use Topics    Alcohol use: Not Currently      Drug use:  Denies  Work for [employer]      Family History   Problem Relation Age of Onset    Colon Cancer Father 48     No Known Allergies     Prior to Admission medications    Medication Sig Start Date End Date Taking? Authorizing Provider   acetaminophen (Tylenol Extra Strength) 500 mg tablet Take 500 mg by mouth every six (6) hours as needed for Pain. Yes Provider, Historical   ibuprofen (Motrin IB) 200 mg tablet Take 200 mg by mouth every six (6) hours as needed for Pain. Yes Provider, Historical     REVIEW OF SYSTEMS:  POSITIVE= Bold.   Negative = normal text  General:  fever, chills, sweats, generalized weakness, weight loss/gain, loss of appetite  Eyes:  blurred vision, eye pain, loss of vision, diplopia  Ear Nose and Throat:  rhinorrhea, pharyngitis  Respiratory:   cough, sputum production dark brown sputum, SOB, wheezing, XIONG, pleuritic pain  Cardiology:  chest pain, palpitations, orthopnea, PND, edema, syncope   Gastrointestinal:  abdominal pain, N/V, dysphagia, diarrhea, constipation, bleeding  Genitourinary:  frequency, urgency, dysuria, hematuria, incontinence  Muskuloskeletal :  arthralgia, myalgia  Hematology:  easy bruising, bleeding, lymphadenopathy  Dermatological:  rash, ulceration, pruritis  Endocrine:  hot flashes or polydipsia  Neurological:  headache, dizziness, confusion, focal weakness, paresthesia, memory loss, gait disturbance  Psychological: anxiety, depression, agitation      Objective:   VITALS:    Visit Vitals  /80 (BP 1 Location: Right arm, BP Patient Position: Supine; At rest)   Pulse 69   Temp 100.3 °F (37.9 °C)   Resp 26   Ht 6' (1.829 m)   Wt 88.9 kg (196 lb)   SpO2 90%   BMI 26.58 kg/m²     Temp (24hrs), Av.6 °F (38.1 °C), Min:100.3 °F (37.9 °C), Max:100.8 °F (38.2 °C)    Body mass index is 26.58 kg/m². PHYSICAL EXAM:    General:    Alert, well developed and well nourished pleasant male, cooperative, no distress, appears stated age. HEENT: Atraumatic, anicteric sclerae, pink conjunctivae     No oral ulcers, mucosa dry, throat clear. Hearing intact. Neck:  Supple, symmetrical,  thyroid: non tender  Lungs:   Crackles throughout on right, crackles from base up to mid lung on left. No Wheezing or Rhonchi. Chest wall:  No tenderness  No Accessory muscle use. Heart:   Regular  rhythm,  No  murmur   No gallop. No edema. Abdomen:   Soft, non-tender. Not distended. Bowel sounds normal. No masses  Extremities: No cyanosis. No clubbing  Skin:     Not pale Not Jaundiced  No rashes   Psych:  Good insight. Not depressed. Not anxious or agitated. Neurologic: EOMs intact. No facial asymmetry. No aphasia or slurred speech. Symmetrical strength, Alert and oriented X 3. Peripheral pulse: Bilateral, DP, 2+  Capillary refill:  normal    IMAGING RESULTS:   []       I have personally reviewed the actual   []     CXR  []     CT scan  CXR:  CTA chest:  There are no intraluminal filling defects to suggest pulmonary embolism. The  heart, pericardium, and great vessels appear unremarkable. The chest wall and  axilla appear unremarkable. There is opacification throughout the lungs  bilaterally, greater on the right. The osseous structures appear unremarkable.   EKG: reviewed SR 81, nonspecific ST-T abnormality throughout   ________________________________________________________________________  Care Plan discussed with:    Comments   Patient y    [facility]:      ________________________________________________________________________  Prophylaxis:  GI none   DVT lovenox   ________________________________________________________________________  Recommended Disposition:   Home with Family y   HH/PT/OT/RN    SNF/LTC    TAI    ________________________________________________________________________  Code Status:  Full Code y   DNR/DNI    ________________________________________________________________________  TOTAL TIME: 50 minutes  ______________________________________________________________________  Sera Kemps, MD      Procedures: see electronic medical records for all procedures/Xrays and details which were not copied into this note but were reviewed prior to creation of Plan.     LAB DATA REVIEWED:    Recent Results (from the past 24 hour(s))   EKG, 12 LEAD, INITIAL    Collection Time: 03/18/21 12:45 AM   Result Value Ref Range    Ventricular Rate 81 BPM    Atrial Rate 81 BPM    P-R Interval 170 ms    QRS Duration 88 ms    Q-T Interval 334 ms    QTC Calculation (Bezet) 387 ms    Calculated P Axis 45 degrees    Calculated R Axis 41 degrees    Calculated T Axis 42 degrees    Diagnosis       Normal sinus rhythm  Voltage criteria for left ventricular hypertrophy  Nonspecific ST and T wave abnormality  No previous ECGs available     CBC WITH AUTOMATED DIFF    Collection Time: 03/18/21 12:56 AM   Result Value Ref Range    WBC 8.3 4.1 - 11.1 K/uL    RBC 4.37 4.10 - 5.70 M/uL    HGB 12.9 12.1 - 17.0 g/dL    HCT 35.2 (L) 36.6 - 50.3 %    MCV 80.5 80.0 - 99.0 FL    MCH 29.5 26.0 - 34.0 PG    MCHC 36.6 (H) 30.0 - 36.5 g/dL    RDW 13.4 11.5 - 14.5 %    PLATELET 126 317 - 061 K/uL    MPV 10.5 8.9 - 12.9 FL    NRBC 0.0 0  WBC    ABSOLUTE NRBC 0.00 0.00 - 0.01 K/uL NEUTROPHILS 76 (H) 32 - 75 %    LYMPHOCYTES 10 (L) 12 - 49 %    MONOCYTES 12 5 - 13 %    EOSINOPHILS 0 0 - 7 %    BASOPHILS 0 0 - 1 %    IMMATURE GRANULOCYTES 2 (H) 0.0 - 0.5 %    ABS. NEUTROPHILS 6.3 1.8 - 8.0 K/UL    ABS. LYMPHOCYTES 0.9 0.8 - 3.5 K/UL    ABS. MONOCYTES 1.0 0.0 - 1.0 K/UL    ABS. EOSINOPHILS 0.0 0.0 - 0.4 K/UL    ABS. BASOPHILS 0.0 0.0 - 0.1 K/UL    ABS. IMM. GRANS. 0.1 (H) 0.00 - 0.04 K/UL    DF AUTOMATED     METABOLIC PANEL, COMPREHENSIVE    Collection Time: 03/18/21 12:56 AM   Result Value Ref Range    Sodium 132 (L) 136 - 145 mmol/L    Potassium 4.1 3.5 - 5.1 mmol/L    Chloride 102 97 - 108 mmol/L    CO2 26 21 - 32 mmol/L    Anion gap 4 (L) 5 - 15 mmol/L    Glucose 106 (H) 65 - 100 mg/dL    BUN 15 6 - 20 MG/DL    Creatinine 1.39 (H) 0.70 - 1.30 MG/DL    BUN/Creatinine ratio 11 (L) 12 - 20      GFR est AA >60 >60 ml/min/1.73m2    GFR est non-AA 53 (L) >60 ml/min/1.73m2    Calcium 7.7 (L) 8.5 - 10.1 MG/DL    Bilirubin, total 1.2 (H) 0.2 - 1.0 MG/DL    ALT (SGPT) 173 (H) 12 - 78 U/L    AST (SGOT) 127 (H) 15 - 37 U/L    Alk.  phosphatase 82 45 - 117 U/L    Protein, total 7.2 6.4 - 8.2 g/dL    Albumin 2.6 (L) 3.5 - 5.0 g/dL    Globulin 4.6 (H) 2.0 - 4.0 g/dL    A-G Ratio 0.6 (L) 1.1 - 2.2     CULTURE, BLOOD    Collection Time: 03/18/21 12:56 AM    Specimen: Blood   Result Value Ref Range    Special Requests: NO SPECIAL REQUESTS      Culture result: NO GROWTH AFTER 2 HOURS     URINALYSIS W/ REFLEX CULTURE    Collection Time: 03/18/21 12:56 AM    Specimen: Urine   Result Value Ref Range    Color YELLOW/STRAW      Appearance CLEAR CLEAR      Specific gravity 1.012 1.003 - 1.030      pH (UA) 6.5 5.0 - 8.0      Protein 100 (A) NEG mg/dL    Glucose Negative NEG mg/dL    Ketone Negative NEG mg/dL    Bilirubin Negative NEG      Blood MODERATE (A) NEG      Urobilinogen 1.0 0.2 - 1.0 EU/dL    Nitrites Negative NEG      Leukocyte Esterase Negative NEG      WBC 5-10 0 - 4 /hpf    RBC 0-5 0 - 5 /hpf Epithelial cells FEW FEW /lpf    Bacteria Negative NEG /hpf    UA:UC IF INDICATED CULTURE NOT INDICATED BY UA RESULT CNI     TROPONIN I    Collection Time: 03/18/21 12:56 AM   Result Value Ref Range    Troponin-I, Qt. <0.05 <0.05 ng/mL   CULTURE, BLOOD    Collection Time: 03/18/21  1:20 AM    Specimen: Blood   Result Value Ref Range    Special Requests: NO SPECIAL REQUESTS      Culture result: NO GROWTH AFTER 2 HOURS     POC LACTIC ACID    Collection Time: 03/18/21  1:39 AM   Result Value Ref Range    Lactic Acid (POC) 1.99 0.40 - 2.00 mmol/L   COVID-19 RAPID TEST    Collection Time: 03/18/21  2:55 AM   Result Value Ref Range    Specimen source Please find results under separate order      COVID-19 rapid test Detected (AA) NOTD

## 2021-03-18 NOTE — Clinical Note
Status[de-identified] INPATIENT [101]   Type of Bed: Telemetry [19]   Inpatient Hospitalization Certified Necessary for the Following Reasons: 3. Patient receiving treatment that can only be provided in an inpatient setting (further clarification in H&P documentation)   Admitting Diagnosis: Pneumonia due to COVID-19 virus [5127125621]   Admitting Diagnosis: Orthostatic hypotension [458. 0. ICD-9-CM]   Admitting Diagnosis: Syncope [472700]   Admitting Physician: Andrew Palmer [[id number]]   Attending Physician: Andrew Palmer [[id number]]   Estimated Length of Stay: 3-4 Midnights   Discharge Plan[de-identified] Home with Office Follow-up

## 2021-03-18 NOTE — ED NOTES
Bedside shift change report given to JEANA Herrera (oncoming nurse) by Beti Valentin RN (offgoing nurse). Report included the following information SBAR, Kardex and Recent Results.

## 2021-03-19 LAB
ALBUMIN SERPL-MCNC: 2.3 G/DL (ref 3.5–5)
ALBUMIN/GLOB SERPL: 0.5 {RATIO} (ref 1.1–2.2)
ALP SERPL-CCNC: 72 U/L (ref 45–117)
ALT SERPL-CCNC: 133 U/L (ref 12–78)
ANION GAP SERPL CALC-SCNC: 6 MMOL/L (ref 5–15)
AST SERPL-CCNC: 63 U/L (ref 15–37)
BILIRUB SERPL-MCNC: 0.7 MG/DL (ref 0.2–1)
BUN SERPL-MCNC: 20 MG/DL (ref 6–20)
BUN/CREAT SERPL: 19 (ref 12–20)
CALCIUM SERPL-MCNC: 7.7 MG/DL (ref 8.5–10.1)
CHLORIDE SERPL-SCNC: 118 MMOL/L (ref 97–108)
CO2 SERPL-SCNC: 20 MMOL/L (ref 21–32)
CREAT SERPL-MCNC: 1.04 MG/DL (ref 0.7–1.3)
ERYTHROCYTE [DISTWIDTH] IN BLOOD BY AUTOMATED COUNT: 13.5 % (ref 11.5–14.5)
GLOBULIN SER CALC-MCNC: 4.2 G/DL (ref 2–4)
GLUCOSE BLD STRIP.AUTO-MCNC: 140 MG/DL (ref 65–100)
GLUCOSE SERPL-MCNC: 146 MG/DL (ref 65–100)
HCT VFR BLD AUTO: 35.6 % (ref 36.6–50.3)
HGB BLD-MCNC: 12.9 G/DL (ref 12.1–17)
MCH RBC QN AUTO: 29 PG (ref 26–34)
MCHC RBC AUTO-ENTMCNC: 36.2 G/DL (ref 30–36.5)
MCV RBC AUTO: 80 FL (ref 80–99)
NRBC # BLD: 0 K/UL (ref 0–0.01)
NRBC BLD-RTO: 0 PER 100 WBC
PLATELET # BLD AUTO: 465 K/UL (ref 150–400)
PMV BLD AUTO: 10.5 FL (ref 8.9–12.9)
POTASSIUM SERPL-SCNC: 3.5 MMOL/L (ref 3.5–5.1)
PROT SERPL-MCNC: 6.5 G/DL (ref 6.4–8.2)
RBC # BLD AUTO: 4.45 M/UL (ref 4.1–5.7)
SERVICE CMNT-IMP: ABNORMAL
SODIUM SERPL-SCNC: 144 MMOL/L (ref 136–145)
WBC # BLD AUTO: 9.7 K/UL (ref 4.1–11.1)

## 2021-03-19 PROCEDURE — 74011250636 HC RX REV CODE- 250/636: Performed by: HOSPITALIST

## 2021-03-19 PROCEDURE — 82962 GLUCOSE BLOOD TEST: CPT

## 2021-03-19 PROCEDURE — 74011250637 HC RX REV CODE- 250/637: Performed by: HOSPITALIST

## 2021-03-19 PROCEDURE — 77010033678 HC OXYGEN DAILY

## 2021-03-19 PROCEDURE — 74011000258 HC RX REV CODE- 258: Performed by: HOSPITALIST

## 2021-03-19 PROCEDURE — 85027 COMPLETE CBC AUTOMATED: CPT

## 2021-03-19 PROCEDURE — 74011000250 HC RX REV CODE- 250: Performed by: NURSE PRACTITIONER

## 2021-03-19 PROCEDURE — 80053 COMPREHEN METABOLIC PANEL: CPT

## 2021-03-19 PROCEDURE — 65660000000 HC RM CCU STEPDOWN

## 2021-03-19 PROCEDURE — 36415 COLL VENOUS BLD VENIPUNCTURE: CPT

## 2021-03-19 PROCEDURE — 74011000250 HC RX REV CODE- 250: Performed by: HOSPITALIST

## 2021-03-19 RX ORDER — SODIUM CHLORIDE 450 MG/100ML
100 INJECTION, SOLUTION INTRAVENOUS CONTINUOUS
Status: DISCONTINUED | OUTPATIENT
Start: 2021-03-19 | End: 2021-03-19

## 2021-03-19 RX ADMIN — SODIUM CHLORIDE 100 ML/HR: 4.5 INJECTION, SOLUTION INTRAVENOUS at 06:25

## 2021-03-19 RX ADMIN — Medication 10 ML: at 14:00

## 2021-03-19 RX ADMIN — Medication 2000 UNITS: at 08:48

## 2021-03-19 RX ADMIN — Medication 10 ML: at 22:00

## 2021-03-19 RX ADMIN — AZITHROMYCIN MONOHYDRATE 500 MG: 500 INJECTION, POWDER, LYOPHILIZED, FOR SOLUTION INTRAVENOUS at 10:13

## 2021-03-19 RX ADMIN — CEFTRIAXONE 1 G: 1 INJECTION, POWDER, FOR SOLUTION INTRAMUSCULAR; INTRAVENOUS at 08:48

## 2021-03-19 RX ADMIN — OXYCODONE HYDROCHLORIDE AND ACETAMINOPHEN 500 MG: 500 TABLET ORAL at 08:48

## 2021-03-19 RX ADMIN — ENOXAPARIN SODIUM 40 MG: 40 INJECTION SUBCUTANEOUS at 08:47

## 2021-03-19 RX ADMIN — OXYCODONE HYDROCHLORIDE AND ACETAMINOPHEN 500 MG: 500 TABLET ORAL at 17:56

## 2021-03-19 RX ADMIN — ZINC SULFATE 220 MG (50 MG) CAPSULE 1 CAPSULE: CAPSULE at 21:27

## 2021-03-19 RX ADMIN — REMDESIVIR 100 MG: 100 INJECTION, POWDER, LYOPHILIZED, FOR SOLUTION INTRAVENOUS at 09:28

## 2021-03-19 RX ADMIN — ZINC SULFATE 220 MG (50 MG) CAPSULE 1 CAPSULE: CAPSULE at 08:48

## 2021-03-19 RX ADMIN — Medication 10 ML: at 05:58

## 2021-03-19 NOTE — PROGRESS NOTES
Hospitalist Progress Note    NAME: Kalyan Joseph   :  1965   MRN:  [id number]       Assessment / Plan:  Acute hypoxic respiratory failure, POA due to  Bilateral covid pneumonia, POA  Acute hepatitis due to covid-19 infection  --CTA chest negative for PE, + b/l pneumonia R>L. Rapid covid +. Tested + outside on 3/2/21 but now with increasing XIONG  --86, 89% RA at times at rest.  Put on 2L O2 on admission,    --continue decadron 6mg x 10 days total  --continue azithromycin and  rocephin for total 5 days  --c/w remdesivir, monitor LFTs  --vitamin C, D, zinc for 1 week  Fu inflammatory marks:  LDH, ferritin, fibrinogen, crp, d-dimer     Syncope x 2 due to orthostatic hypotension  BETHANY, POA Cr 1.39 and hyponatremia due to dehydration  --given 2L in ER but orthostatic down to 67/42 with standing  S/p  IVF 150ml/hr. - troponin 0.05>0.05       Body mass index is 26.58 kg/m².     Code: full  DVT prophylaxis: lovenox  Surrogate decision maker:  Wife Ezequiel Kussmaul    25.0 - 29.9 Overweight / Body mass index is 25.95 kg/m². Subjective:     Chief Complaint / Reason for Physician Visit  FU hypoxia /covid pneumonia . Feeling much better  Discussed with RN events overnight. Review of Systems:  Symptom Y/N Comments  Symptom Y/N Comments   Fever/Chills n   Chest Pain n    Poor Appetite    Edema     Cough    Abdominal Pain n    Sputum    Joint Pain     SOB/XIONG n   Pruritis/Rash     Nausea/vomit n   Tolerating PT/OT     Diarrhea    Tolerating Diet     Constipation    Other       Could NOT obtain due to:      Objective:     VITALS:   Last 24hrs VS reviewed since prior progress note.  Most recent are:  Patient Vitals for the past 24 hrs:   Temp Pulse Resp BP SpO2   21 0349  (!) 54      21 0340    108/75 92 %   21 0338    (!) 78/43 96 %   21 0337    104/74 95 %   21 2356  (!) 57      21 2235 98.3 °F (36.8 °C) 62 24 (!) 146/96 91 %   21  (!) 59      21 Mars Jacobson   03/18/21 1930    106/71    03/18/21 1922 98.3 °F (36.8 °C) 68 26 (!) 149/92 92 %   03/18/21 1600  70      03/18/21 1417 98.6 °F (37 °C) 69 (!) 39 (!) 139/90 94 %   03/18/21 1400  66 (!) 34 (!) 108/91 90 %   03/18/21 1345  69 (!) 35 128/80 92 %   03/18/21 1330  69 (!) 33 129/82 90 %   03/18/21 1300  65 (!) 35 131/81 95 %   03/18/21 1230 98.3 °F (36.8 °C) (!) 59 (!) 36 126/87 90 %   03/18/21 1100  66 (!) 35 119/76 90 %   03/18/21 1030  70 (!) 32 121/74 90 %   03/18/21 0900  68 27 (!) 94/57 93 %   03/18/21 0858  72 30 124/82 92 %   03/18/21 0830  64 (!) 32 (!) 142/87 90 %   03/18/21 0800  68 (!) 35 133/82 91 %       Intake/Output Summary (Last 24 hours) at 3/19/2021 0707  Last data filed at 3/19/2021 0107  Gross per 24 hour   Intake 290 ml   Output 1300 ml   Net -1010 ml        I had a face to face encounter and independently examined this patient on 3/19/2021, as outlined below:  PHYSICAL EXAM:  General: WD, WN. Alert, cooperative, no acute distress    EENT:  EOMI. Anicteric sclerae. MMM  Resp:  CTA bilaterally, no wheezing or rales. No accessory muscle use  CV:  Regular  rhythm,  No edema  GI:  Soft, Non distended, Non tender. +Bowel sounds  Neurologic:  Alert and oriented X 3, normal speech,   Psych:   Good insight. Not anxious nor agitated  Skin:  No rashes. No jaundice    Reviewed most current lab test results and cultures  YES  Reviewed most current radiology test results   YES  Review and summation of old records today    NO  Reviewed patient's current orders and MAR    YES  PMH/SH reviewed - no change compared to H&P  ________________________________________________________________________  Care Plan discussed with:    Comments   Patient x    Family      RN x    Care Manager     Consultant                        Multidiciplinary team rounds were held today with , nursing, pharmacist and clinical coordinator.   Patient's plan of care was discussed; medications were reviewed and discharge planning was addressed. ________________________________________________________________________  Total NON critical care TIME: 35Minutes    Total CRITICAL CARE TIME Spent:   Minutes non procedure based      Comments   >50% of visit spent in counseling and coordination of care     ________________________________________________________________________  Jessica Hardy MD     Procedures: see electronic medical records for all procedures/Xrays and details which were not copied into this note but were reviewed prior to creation of Plan. LABS:  I reviewed today's most current labs and imaging studies.   Pertinent labs include:  Recent Labs     03/19/21  0400 03/18/21  0056   WBC 9.7 8.3   HGB 12.9 12.9   HCT 35.6* 35.2*   * 331     Recent Labs     03/19/21  0400 03/18/21  0854 03/18/21  0056     --  132*   K 3.5  --  4.1   *  --  102   CO2 20*  --  26   *  --  106*   BUN 20  --  15   CREA 1.04  --  1.39*   CA 7.7*  --  7.7*   ALB 2.3*  --  2.6*   TBILI 0.7  --  1.2*   *  --  173*   INR  --  1.1  --        Signed: Jessica Hardy MD

## 2021-03-19 NOTE — PROGRESS NOTES
Received notification from bedside RN about patient with regards to: Na 144, have decreased NS rate from 150 to 100 last night, symptoms better  VS: /75, HR 54, RR 24, O2 sat 92% on NC 1 L    Intervention given: changed IVF from NS to 1/2 NS

## 2021-03-19 NOTE — PROGRESS NOTES
0700 Bedside and Verbal shift change report given to [employer] (oncoming nurse) by AL (offgoing nurse). Report included the following information SBAR, Kardex, ED Summary, Intake/Output, MAR, Recent Results, Med Rec Status, Cardiac Rhythm Sinus Jose Luis/ NSR, Alarm Parameters  and Quality Measures.      1042 Messaged Dr. Keely Murray about discontinuing IVF at this time. Verbal order given and order placed. 1100 This RN attempeted to placed a IV x2 and was unsucessful at this time. 1129 Updated wife on care of plan. R Sara Diego 109, RN attempting IV and was successful at this time. [facility] about transfers order. Roseann Fish RN charge nurse received orders and placed orders. 1800 TRANSFER - OUT REPORT:    Verbal report given to JEANA Trujillo (name) on [facility]  being transferred to MEDICAL (unit) for routine progression of care       Report consisted of patients Situation, Background, Assessment and   Recommendations(SBAR). Information from the following report(s) SBAR, Kardex, ED Summary, OR Summary, Intake/Output, MAR, Recent Results, Med Rec Status and Cardiac Rhythm NSR was reviewed with the receiving nurse. Lines:   Peripheral IV 03/19/21 Left;Upper Cephalic (Active)   Site Assessment Clean, dry, & intact 03/19/21 1459   Phlebitis Assessment 0 03/19/21 1459   Infiltration Assessment 0 03/19/21 1459   Dressing Status Clean, dry, & intact 03/19/21 1459   Dressing Type Tape;Transparent 03/19/21 1459   Hub Color/Line Status Pink;Flushed;Capped 03/19/21 1459   Alcohol Cap Used Yes 03/19/21 1459        Opportunity for questions and clarification was provided.       Patient transported with:   [facility] AND RN

## 2021-03-19 NOTE — PROGRESS NOTES
Transition of Care Plan:    RUR: 8% - Low  Disposition: TBD -likely home with family assistance  Follow up appointments: PCP  DME needed: TBD - possibly Home 02  Transportation at Discharge: Pt's wife  Fadia Marrero or means to access home: Pt's wife has keys       Caregiver Contact: Spouse, Dina Peters ([phone])  Discharge Caregiver contacted prior to discharge? CM will contact pt's cg prior to d/c    Reason for Admission:  PNA due to COVID-19 Virus, Orthostatic Hypotension, Syncope                     RUR Score:        8% - Low             Plan for utilizing home health:   No needs identified at this time. PCP: First and Last name:  Tacho Alcazar MD     Name of Practice: [facility]   Are you a current patient: Yes/No: Yes   Approximate date of last visit: A year ago   Can you participate in a virtual visit with your PCP: Yes                    Current Advanced Directive/Advance Care Plan: Full Code. Pt has no ACP docs on file      Healthcare Decision Maker:  Maribell Iniguez, Dina Peters ([phone])                         Transition of Care Plan:    Home with family assistance    CM contacted patient's wife via telephone to complete initial assessment due to contact isolation precaustions. CM introduced role, verified demographics, and discussed discharge planning. Pt is a 48 yo male with an admitting diagnosis PNA due to COVID-19 Virus, Orthostatic Hypotension, and Syncope. Pt's insurance is [employer] and he uses [employer] and [address] [place] [address] for his prescriptions. Pt lives with his wife in a two-story in a [address] [facility]. Pt is independent of all ADL/IADLs and drives himself. Pt has no home DME and no hx of HH, SNF, and IPR. Pt's wife will transport him home once he is d/c from the hospital.    CM will continue to follow for discharge planning while patient is admitted on current unit. Please contact CM with questions or issues related to discharge.      Care Management Interventions  PCP Verified by CM: Yes(Dr. Javon Anguiano - last visit was a year ago)  Mode of Transport at Discharge: Other (see comment)(Pt's wife)  Transition of Care Consult (CM Consult): Discharge Planning  Discharge Durable Medical Equipment: No  Physical Therapy Consult: No  Occupational Therapy Consult: No  Speech Therapy Consult: No  Current Support Network: Lives with Spouse(Pt lives with his wife in a 2 story home with 4 MAND[address])  Confirm Follow Up Transport: Self  Discharge Location  Discharge Placement: Home with family assistance  ---------------------------------------------------------------------  Advance Care Planning     General Advance Care Planning (ACP) Conversation      Date of Conversation: 3/19/2021  Conducted with: Patient with Decision Making Capacity and Healthcare Decision Maker: Next of Kin by law (only applies in absence of a Healthcare Power of  or Legal Guardian)    Healthcare Decision Maker:     Primary Decision Maker: [id number] [facility] - [phone]  Today we documented Decision Maker(s) consistent with Legal Next of [address].     Content/Action Overview:   DECLINED ACP conversation - will revisit periodically   Reviewed DNR/DNI and patient elects Full Code (Attempt Resuscitation)    Length of Voluntary ACP Conversation in minutes:  <16 minutes (Non-Billable)    AARON Chavez  Care Manager [facility]  [phone]

## 2021-03-19 NOTE — ROUTINE PROCESS
Bedside and Verbal shift change report given to AL (oncoming nurse) by Fermin Joe (offgoing nurse). Report included the following information SBAR, Kardex, ED Summary, Intake/Output, MAR, Recent Results, Med Rec Status, Cardiac Rhythm Sinus Jose Luis/ NSR, Alarm Parameters  and Quality Measures. Pt ambulate to bathroom and ladonna BM x1 assist. Pt back in bed with BP as follows; Standing 97/61; Sitting 106/71, and Lying supine 149/92. Pt A/O x4 with no sign of physical distress and will continue to monitor. 2234: Pt O2 Sats btw 88 to 90 and placed on 2 L nc. 2253: Messaged hospitalistAllison to titrate continuous NS to 100/75 mL/hr due to Pt BP. Orders placed and continue to monitor. Pt walked for 2 mins 18 sec in room, states no dizziness but little unstable gait on last minute and needs O2 at 2 L for oxygen saturation to stay >90% during activity. Pt standing daily weight 86.8 kg, back in bed, and will continue to monitor. 2548: Face to face discussion with Cristopher Gaucher, NP about Pt improved Labs especially Na and recommend changing 0.9 NS to 1/2 NS. Orders placed. End of Shift Note Bedside shift change report given to Fermin Joe (oncoming nurse) by Marlyn Bishop (offgoing nurse). Report included the following information SBAR, Kardex, ED Summary, Intake/Output, MAR, Recent Results, Med Rec Status, Alarm Parameters  and Quality Measures Shift worked:  [facility] Shift summary and any significant changes:  
  See shift note above Concerns for physician to address:  . ... Zone phone for oncoming shift:   0136 Activity: 
Activity Level: Up with Assistance Number times ambulated in hallways past shift: 0 Number of times OOB to chair past shift: 3 Cardiac:  
Cardiac Monitoring: Yes     
Cardiac Rhythm: Sinus bradycardia Access:  
Current line(s): PIV Genitourinary:  
Urinary status: voiding Respiratory:  
O2 Device: Nasal cannula Chronic home O2 use?: NO Incentive spirometer at bedside: NO 
  
GI: 
Last Bowel Movement Date: 03/18/21 Current diet:  DIET REGULAR Passing flatus: YES Tolerating current diet: YES Pain Management:  
Patient states pain is manageable on current regimen: YES Skin: 
Russell Score: 20 Interventions: increase time out of bed Patient Safety: 
Fall Score: Total Score: 2 Interventions: bed/chair alarm, gripper socks and pt to call before getting OOB High Fall Risk: Yes Length of Stay: 
Expected LOS: - - - Actual LOS: 1 Ivana Spann

## 2021-03-19 NOTE — PROGRESS NOTES
Verbal shift change report given to Marino Colin (oncoming nurse) by Eliazar Bryan (offgoing nurse). Report included the following information SBAR, Kardex, Intake/Output, MAR and Recent Results.

## 2021-03-20 LAB
ALBUMIN SERPL-MCNC: 2.2 G/DL (ref 3.5–5)
ALBUMIN SERPL-MCNC: 2.3 G/DL (ref 3.5–5)
ALBUMIN/GLOB SERPL: 0.6 {RATIO} (ref 1.1–2.2)
ALBUMIN/GLOB SERPL: 0.6 {RATIO} (ref 1.1–2.2)
ALP SERPL-CCNC: 70 U/L (ref 45–117)
ALP SERPL-CCNC: 74 U/L (ref 45–117)
ALT SERPL-CCNC: 106 U/L (ref 12–78)
ALT SERPL-CCNC: 113 U/L (ref 12–78)
ANION GAP SERPL CALC-SCNC: 4 MMOL/L (ref 5–15)
ANION GAP SERPL CALC-SCNC: 5 MMOL/L (ref 5–15)
AST SERPL-CCNC: 30 U/L (ref 15–37)
AST SERPL-CCNC: 36 U/L (ref 15–37)
BASOPHILS # BLD: 0 K/UL (ref 0–0.1)
BASOPHILS # BLD: 0 K/UL (ref 0–0.1)
BASOPHILS NFR BLD: 0 % (ref 0–1)
BASOPHILS NFR BLD: 0 % (ref 0–1)
BILIRUB SERPL-MCNC: 0.6 MG/DL (ref 0.2–1)
BILIRUB SERPL-MCNC: 0.7 MG/DL (ref 0.2–1)
BUN SERPL-MCNC: 23 MG/DL (ref 6–20)
BUN SERPL-MCNC: 24 MG/DL (ref 6–20)
BUN/CREAT SERPL: 21 (ref 12–20)
BUN/CREAT SERPL: 22 (ref 12–20)
CALCIUM SERPL-MCNC: 7.7 MG/DL (ref 8.5–10.1)
CALCIUM SERPL-MCNC: 7.7 MG/DL (ref 8.5–10.1)
CHLORIDE SERPL-SCNC: 116 MMOL/L (ref 97–108)
CHLORIDE SERPL-SCNC: 117 MMOL/L (ref 97–108)
CO2 SERPL-SCNC: 22 MMOL/L (ref 21–32)
CO2 SERPL-SCNC: 22 MMOL/L (ref 21–32)
CREAT SERPL-MCNC: 1.07 MG/DL (ref 0.7–1.3)
CREAT SERPL-MCNC: 1.08 MG/DL (ref 0.7–1.3)
CRP SERPL-MCNC: 3.52 MG/DL (ref 0–0.6)
DIFFERENTIAL METHOD BLD: ABNORMAL
DIFFERENTIAL METHOD BLD: ABNORMAL
EOSINOPHIL # BLD: 0 K/UL (ref 0–0.4)
EOSINOPHIL # BLD: 0 K/UL (ref 0–0.4)
EOSINOPHIL NFR BLD: 0 % (ref 0–7)
EOSINOPHIL NFR BLD: 0 % (ref 0–7)
ERYTHROCYTE [DISTWIDTH] IN BLOOD BY AUTOMATED COUNT: 13.8 % (ref 11.5–14.5)
ERYTHROCYTE [DISTWIDTH] IN BLOOD BY AUTOMATED COUNT: 13.8 % (ref 11.5–14.5)
GLOBULIN SER CALC-MCNC: 4 G/DL (ref 2–4)
GLOBULIN SER CALC-MCNC: 4 G/DL (ref 2–4)
GLUCOSE SERPL-MCNC: 107 MG/DL (ref 65–100)
GLUCOSE SERPL-MCNC: 125 MG/DL (ref 65–100)
HCT VFR BLD AUTO: 34 % (ref 36.6–50.3)
HCT VFR BLD AUTO: 35.4 % (ref 36.6–50.3)
HGB BLD-MCNC: 12.5 G/DL (ref 12.1–17)
HGB BLD-MCNC: 13 G/DL (ref 12.1–17)
IMM GRANULOCYTES # BLD AUTO: 0.1 K/UL (ref 0–0.04)
IMM GRANULOCYTES # BLD AUTO: 0.2 K/UL (ref 0–0.04)
IMM GRANULOCYTES NFR BLD AUTO: 1 % (ref 0–0.5)
IMM GRANULOCYTES NFR BLD AUTO: 1 % (ref 0–0.5)
LYMPHOCYTES # BLD: 0.7 K/UL (ref 0.8–3.5)
LYMPHOCYTES # BLD: 1 K/UL (ref 0.8–3.5)
LYMPHOCYTES NFR BLD: 5 % (ref 12–49)
LYMPHOCYTES NFR BLD: 7 % (ref 12–49)
MCH RBC QN AUTO: 28.8 PG (ref 26–34)
MCH RBC QN AUTO: 29.2 PG (ref 26–34)
MCHC RBC AUTO-ENTMCNC: 36.7 G/DL (ref 30–36.5)
MCHC RBC AUTO-ENTMCNC: 36.8 G/DL (ref 30–36.5)
MCV RBC AUTO: 78.5 FL (ref 80–99)
MCV RBC AUTO: 79.4 FL (ref 80–99)
MONOCYTES # BLD: 1.4 K/UL (ref 0–1)
MONOCYTES # BLD: 1.4 K/UL (ref 0–1)
MONOCYTES NFR BLD: 10 % (ref 5–13)
MONOCYTES NFR BLD: 10 % (ref 5–13)
NEUTS SEG # BLD: 11.3 K/UL (ref 1.8–8)
NEUTS SEG # BLD: 11.8 K/UL (ref 1.8–8)
NEUTS SEG NFR BLD: 82 % (ref 32–75)
NEUTS SEG NFR BLD: 84 % (ref 32–75)
NRBC # BLD: 0 K/UL (ref 0–0.01)
NRBC # BLD: 0.02 K/UL (ref 0–0.01)
NRBC BLD-RTO: 0 PER 100 WBC
NRBC BLD-RTO: 0.1 PER 100 WBC
PLATELET # BLD AUTO: 549 K/UL (ref 150–400)
PLATELET # BLD AUTO: 553 K/UL (ref 150–400)
PMV BLD AUTO: 10.3 FL (ref 8.9–12.9)
PMV BLD AUTO: 10.4 FL (ref 8.9–12.9)
POTASSIUM SERPL-SCNC: 3.6 MMOL/L (ref 3.5–5.1)
POTASSIUM SERPL-SCNC: 3.7 MMOL/L (ref 3.5–5.1)
PROT SERPL-MCNC: 6.2 G/DL (ref 6.4–8.2)
PROT SERPL-MCNC: 6.3 G/DL (ref 6.4–8.2)
RBC # BLD AUTO: 4.28 M/UL (ref 4.1–5.7)
RBC # BLD AUTO: 4.51 M/UL (ref 4.1–5.7)
RBC MORPH BLD: ABNORMAL
RBC MORPH BLD: ABNORMAL
SODIUM SERPL-SCNC: 143 MMOL/L (ref 136–145)
SODIUM SERPL-SCNC: 143 MMOL/L (ref 136–145)
WBC # BLD AUTO: 13.5 K/UL (ref 4.1–11.1)
WBC # BLD AUTO: 14.4 K/UL (ref 4.1–11.1)

## 2021-03-20 PROCEDURE — 65660000000 HC RM CCU STEPDOWN

## 2021-03-20 PROCEDURE — 74011250637 HC RX REV CODE- 250/637: Performed by: HOSPITALIST

## 2021-03-20 PROCEDURE — 2709999900 HC NON-CHARGEABLE SUPPLY

## 2021-03-20 PROCEDURE — 80053 COMPREHEN METABOLIC PANEL: CPT

## 2021-03-20 PROCEDURE — 74011000258 HC RX REV CODE- 258: Performed by: HOSPITALIST

## 2021-03-20 PROCEDURE — 86140 C-REACTIVE PROTEIN: CPT

## 2021-03-20 PROCEDURE — 36415 COLL VENOUS BLD VENIPUNCTURE: CPT

## 2021-03-20 PROCEDURE — 74011000250 HC RX REV CODE- 250: Performed by: HOSPITALIST

## 2021-03-20 PROCEDURE — 85025 COMPLETE CBC W/AUTO DIFF WBC: CPT

## 2021-03-20 PROCEDURE — 74011250636 HC RX REV CODE- 250/636: Performed by: INTERNAL MEDICINE

## 2021-03-20 PROCEDURE — 94760 N-INVAS EAR/PLS OXIMETRY 1: CPT

## 2021-03-20 PROCEDURE — 74011250636 HC RX REV CODE- 250/636: Performed by: HOSPITALIST

## 2021-03-20 RX ORDER — HEPARIN SODIUM 5000 [USP'U]/ML
5000 INJECTION, SOLUTION INTRAVENOUS; SUBCUTANEOUS EVERY 8 HOURS
Status: DISCONTINUED | OUTPATIENT
Start: 2021-03-20 | End: 2021-03-20

## 2021-03-20 RX ORDER — ACETAMINOPHEN 325 MG/1
650 TABLET ORAL
Status: DISCONTINUED | OUTPATIENT
Start: 2021-03-20 | End: 2021-03-21 | Stop reason: HOSPADM

## 2021-03-20 RX ORDER — SODIUM CHLORIDE 9 MG/ML
75 INJECTION, SOLUTION INTRAVENOUS CONTINUOUS
Status: DISCONTINUED | OUTPATIENT
Start: 2021-03-20 | End: 2021-03-21

## 2021-03-20 RX ORDER — ACETAMINOPHEN 650 MG/1
650 SUPPOSITORY RECTAL
Status: DISCONTINUED | OUTPATIENT
Start: 2021-03-20 | End: 2021-03-21 | Stop reason: HOSPADM

## 2021-03-20 RX ADMIN — ENOXAPARIN SODIUM 40 MG: 40 INJECTION SUBCUTANEOUS at 09:39

## 2021-03-20 RX ADMIN — DEXAMETHASONE SODIUM PHOSPHATE 6 MG: 4 INJECTION, SOLUTION INTRAMUSCULAR; INTRAVENOUS at 01:14

## 2021-03-20 RX ADMIN — Medication 10 ML: at 06:00

## 2021-03-20 RX ADMIN — OXYCODONE HYDROCHLORIDE AND ACETAMINOPHEN 500 MG: 500 TABLET ORAL at 09:38

## 2021-03-20 RX ADMIN — AZITHROMYCIN MONOHYDRATE 500 MG: 500 INJECTION, POWDER, LYOPHILIZED, FOR SOLUTION INTRAVENOUS at 12:23

## 2021-03-20 RX ADMIN — CEFTRIAXONE 1 G: 1 INJECTION, POWDER, FOR SOLUTION INTRAMUSCULAR; INTRAVENOUS at 10:33

## 2021-03-20 RX ADMIN — Medication 2000 UNITS: at 09:39

## 2021-03-20 RX ADMIN — Medication 10 ML: at 22:00

## 2021-03-20 RX ADMIN — REMDESIVIR 100 MG: 100 INJECTION, POWDER, LYOPHILIZED, FOR SOLUTION INTRAVENOUS at 09:40

## 2021-03-20 RX ADMIN — SODIUM CHLORIDE 500 ML: 9 INJECTION, SOLUTION INTRAVENOUS at 15:40

## 2021-03-20 RX ADMIN — ZINC SULFATE 220 MG (50 MG) CAPSULE 1 CAPSULE: CAPSULE at 09:39

## 2021-03-20 RX ADMIN — ZINC SULFATE 220 MG (50 MG) CAPSULE 1 CAPSULE: CAPSULE at 21:17

## 2021-03-20 RX ADMIN — OXYCODONE HYDROCHLORIDE AND ACETAMINOPHEN 500 MG: 500 TABLET ORAL at 17:41

## 2021-03-20 RX ADMIN — SODIUM CHLORIDE 75 ML/HR: 9 INJECTION, SOLUTION INTRAVENOUS at 17:42

## 2021-03-20 NOTE — PROGRESS NOTES
Hospitalist Progress Note    NAME: Wanda Narvaez   :  1965   MRN:  [id number]       Assessment / Plan:  Acute hypoxic respiratory failure, POA due to  Bilateral covid pneumonia, POA  Acute hepatitis due to covid-19 infection  --CTA chest negative for PE, + b/l pneumonia R>L. Rapid covid +. Tested + outside on 3/2/21 but now with increasing XIONG  --86, 89% RA at times at rest.  Put on 2L O2 on admission,  now on RA  Oxygen challenge showed that he doesn't need 02  --continue decadron 6mg x 10 days total  --continue azithromycin and  rocephin for total 5 days  --c/w remdesivir, monitor LFTs  --vitamin C, D, zinc for 1 week  Fu inflammatory marks:  LDH, ferritin, fibrinogen, crp, d-dimer     Syncope x 2 due to orthostatic hypotension  BETHANY, POA Cr 1.39 and hyponatremia due to dehydration  --given 2L in ER but orthostatic down to 67/42 with standing  S/p  IVF 150ml/hr. - troponin 0.05>0.05  Orthostatic today , reorder IVF      updated wife on   Body mass index is 26.58 kg/m².     Code: full  DVT prophylaxis: lovenox  Surrogate decision maker:  Wife Jesus Cheung    25.0 - 29.9 Overweight / Body mass index is 25.95 kg/m². Subjective:     Chief Complaint / Reason for Physician Visit  FU hypoxia /covid pneumonia . Feeling much better  Discussed with RN events overnight. Review of Systems:  Symptom Y/N Comments  Symptom Y/N Comments   Fever/Chills n   Chest Pain n    Poor Appetite    Edema     Cough    Abdominal Pain n    Sputum    Joint Pain     SOB/XIONG n   Pruritis/Rash     Nausea/vomit n   Tolerating PT/OT     Diarrhea    Tolerating Diet     Constipation    Other       Could NOT obtain due to:      Objective:     VITALS:   Last 24hrs VS reviewed since prior progress note.  Most recent are:  Patient Vitals for the past 24 hrs:   Temp Pulse Resp BP SpO2   21 1436 97.5 °F (36.4 °C) 61 22 (!) 145/86 92 %   21 1037 97.7 °F (36.5 °C) 71 24 111/66 94 %   21 0806 97.9 °F (36.6 °C) 69 22 122/76 92 %   03/20/21 0357 98.4 °F (36.9 °C) 66 18 131/83 91 %   03/20/21 0026 97.8 °F (36.6 °C) 64 18 125/82 90 %   03/19/21 2000 98.4 °F (36.9 °C) 65 18 130/79 94 %   03/19/21 1810  86  (!) 104/58    03/19/21 1808 97.9 °F (36.6 °C) 70 18 103/64 95 %     No intake or output data in the 24 hours ending 03/20/21 1734     I had a face to face encounter and independently examined this patient on 3/20/2021, as outlined below:  PHYSICAL EXAM:  General: WD, WN. Alert, cooperative, no acute distress    EENT:  EOMI. Anicteric sclerae. MMM  Resp:  CTA bilaterally, no wheezing or rales. No accessory muscle use  CV:  Regular  rhythm,  No edema  GI:  Soft, Non distended, Non tender. +Bowel sounds  Neurologic:  Alert and oriented X 3, normal speech,   Psych:   Good insight. Not anxious nor agitated  Skin:  No rashes. No jaundice    Reviewed most current lab test results and cultures  YES  Reviewed most current radiology test results   YES  Review and summation of old records today    NO  Reviewed patient's current orders and MAR    YES  PMH/SH reviewed - no change compared to H&P  ________________________________________________________________________  Care Plan discussed with:    Comments   Patient x    Family      RN x    Care Manager     Consultant                        Multidiciplinary team rounds were held today with , nursing, pharmacist and clinical coordinator. Patient's plan of care was discussed; medications were reviewed and discharge planning was addressed.      ________________________________________________________________________  Total NON critical care TIME: 35Minutes    Total CRITICAL CARE TIME Spent:   Minutes non procedure based      Comments   >50% of visit spent in counseling and coordination of care     ________________________________________________________________________  Raquel Richardson MD     Procedures: see electronic medical records for all procedures/Xrays and details which were not copied into this note but were reviewed prior to creation of Plan. LABS:  I reviewed today's most current labs and imaging studies.   Pertinent labs include:  Recent Labs     03/20/21  1136 03/20/21  0116 03/19/21  0400   WBC 13.5* 14.4* 9.7   HGB 13.0 12.5 12.9   HCT 35.4* 34.0* 35.6*   * 549* 465*     Recent Labs     03/20/21  1136 03/20/21  0116 03/19/21  0400 03/18/21  0854    143 144  --    K 3.7 3.6 3.5  --    * 117* 118*  --    CO2 22 22 20*  --    * 107* 146*  --    BUN 23* 24* 20  --    CREA 1.08 1.07 1.04  --    CA 7.7* 7.7* 7.7*  --    ALB 2.3* 2.2* 2.3*  --    TBILI 0.6 0.7 0.7  --    * 113* 133*  --    INR  --   --   --  1.1       Signed: Yonatan De Santiago MD

## 2021-03-20 NOTE — PROGRESS NOTES
.Bedside shift change report given to israel (oncoming nurse) by Juan Lockwood (offgoing nurse). Report included the following information SBAR, Kardex, MAR and Cardiac Rhythm NSR.

## 2021-03-20 NOTE — PROGRESS NOTES
Bedside and Verbal shift change report given to Herminia  (oncoming nurse) by Catia Rdz (offgoing nurse). Report included the following information SBAR, Kardex, ED Summary, Procedure Summary, Intake/Output, MAR and Recent Results.

## 2021-03-21 VITALS
SYSTOLIC BLOOD PRESSURE: 156 MMHG | TEMPERATURE: 97.4 F | RESPIRATION RATE: 20 BRPM | HEART RATE: 71 BPM | DIASTOLIC BLOOD PRESSURE: 91 MMHG | WEIGHT: 191.36 LBS | BODY MASS INDEX: 25.92 KG/M2 | HEIGHT: 72 IN | OXYGEN SATURATION: 93 %

## 2021-03-21 LAB
ALBUMIN SERPL-MCNC: 2.2 G/DL (ref 3.5–5)
ALBUMIN/GLOB SERPL: 0.6 {RATIO} (ref 1.1–2.2)
ALP SERPL-CCNC: 73 U/L (ref 45–117)
ALT SERPL-CCNC: 113 U/L (ref 12–78)
ANION GAP SERPL CALC-SCNC: 6 MMOL/L (ref 5–15)
AST SERPL-CCNC: 59 U/L (ref 15–37)
BASOPHILS # BLD: 0 K/UL (ref 0–0.1)
BASOPHILS NFR BLD: 0 % (ref 0–1)
BILIRUB SERPL-MCNC: 0.7 MG/DL (ref 0.2–1)
BUN SERPL-MCNC: 22 MG/DL (ref 6–20)
BUN/CREAT SERPL: 20 (ref 12–20)
CALCIUM SERPL-MCNC: 7.7 MG/DL (ref 8.5–10.1)
CHLORIDE SERPL-SCNC: 116 MMOL/L (ref 97–108)
CO2 SERPL-SCNC: 22 MMOL/L (ref 21–32)
CREAT SERPL-MCNC: 1.08 MG/DL (ref 0.7–1.3)
CRP SERPL-MCNC: 2.49 MG/DL (ref 0–0.6)
D DIMER PPP FEU-MCNC: 9.95 MG/L FEU (ref 0–0.65)
DIFFERENTIAL METHOD BLD: ABNORMAL
EOSINOPHIL # BLD: 0 K/UL (ref 0–0.4)
EOSINOPHIL NFR BLD: 0 % (ref 0–7)
ERYTHROCYTE [DISTWIDTH] IN BLOOD BY AUTOMATED COUNT: 13.9 % (ref 11.5–14.5)
GLOBULIN SER CALC-MCNC: 3.9 G/DL (ref 2–4)
GLUCOSE SERPL-MCNC: 95 MG/DL (ref 65–100)
HCT VFR BLD AUTO: 34 % (ref 36.6–50.3)
HGB BLD-MCNC: 12.5 G/DL (ref 12.1–17)
IMM GRANULOCYTES # BLD AUTO: 0.3 K/UL (ref 0–0.04)
IMM GRANULOCYTES NFR BLD AUTO: 2 % (ref 0–0.5)
INR PPP: 1.2 (ref 0.9–1.1)
LYMPHOCYTES # BLD: 1.5 K/UL (ref 0.8–3.5)
LYMPHOCYTES NFR BLD: 11 % (ref 12–49)
MCH RBC QN AUTO: 29.3 PG (ref 26–34)
MCHC RBC AUTO-ENTMCNC: 36.8 G/DL (ref 30–36.5)
MCV RBC AUTO: 79.6 FL (ref 80–99)
MONOCYTES # BLD: 1.5 K/UL (ref 0–1)
MONOCYTES NFR BLD: 11 % (ref 5–13)
NEUTS SEG # BLD: 10.7 K/UL (ref 1.8–8)
NEUTS SEG NFR BLD: 76 % (ref 32–75)
NRBC # BLD: 0.07 K/UL (ref 0–0.01)
NRBC BLD-RTO: 0.5 PER 100 WBC
PLATELET # BLD AUTO: 585 K/UL (ref 150–400)
PMV BLD AUTO: 10.1 FL (ref 8.9–12.9)
POTASSIUM SERPL-SCNC: 3.6 MMOL/L (ref 3.5–5.1)
PROT SERPL-MCNC: 6.1 G/DL (ref 6.4–8.2)
PROTHROMBIN TIME: 12 SEC (ref 9–11.1)
RBC # BLD AUTO: 4.27 M/UL (ref 4.1–5.7)
RBC MORPH BLD: ABNORMAL
RBC MORPH BLD: ABNORMAL
SODIUM SERPL-SCNC: 144 MMOL/L (ref 136–145)
WBC # BLD AUTO: 14 K/UL (ref 4.1–11.1)

## 2021-03-21 PROCEDURE — 85025 COMPLETE CBC W/AUTO DIFF WBC: CPT

## 2021-03-21 PROCEDURE — 86140 C-REACTIVE PROTEIN: CPT

## 2021-03-21 PROCEDURE — 85610 PROTHROMBIN TIME: CPT

## 2021-03-21 PROCEDURE — 74011000250 HC RX REV CODE- 250: Performed by: HOSPITALIST

## 2021-03-21 PROCEDURE — 74011250636 HC RX REV CODE- 250/636: Performed by: HOSPITALIST

## 2021-03-21 PROCEDURE — 36415 COLL VENOUS BLD VENIPUNCTURE: CPT

## 2021-03-21 PROCEDURE — 74011250637 HC RX REV CODE- 250/637: Performed by: HOSPITALIST

## 2021-03-21 PROCEDURE — 74011000258 HC RX REV CODE- 258: Performed by: HOSPITALIST

## 2021-03-21 PROCEDURE — 85379 FIBRIN DEGRADATION QUANT: CPT

## 2021-03-21 PROCEDURE — 74011250636 HC RX REV CODE- 250/636: Performed by: INTERNAL MEDICINE

## 2021-03-21 PROCEDURE — 80053 COMPREHEN METABOLIC PANEL: CPT

## 2021-03-21 RX ORDER — ENOXAPARIN SODIUM 100 MG/ML
0.5 INJECTION SUBCUTANEOUS EVERY 12 HOURS
Status: DISCONTINUED | OUTPATIENT
Start: 2021-03-21 | End: 2021-03-21 | Stop reason: HOSPADM

## 2021-03-21 RX ORDER — ASCORBIC ACID 500 MG
500 TABLET ORAL 2 TIMES DAILY
Qty: 6 TAB | Refills: 0 | Status: SHIPPED | OUTPATIENT
Start: 2021-03-21 | End: 2021-03-24

## 2021-03-21 RX ORDER — DEXAMETHASONE 6 MG/1
6 TABLET ORAL DAILY
Qty: 6 TAB | Refills: 0 | Status: SHIPPED | OUTPATIENT
Start: 2021-03-21 | End: 2021-03-27

## 2021-03-21 RX ORDER — ZINC SULFATE 50(220)MG
220 CAPSULE ORAL EVERY 12 HOURS
Qty: 6 CAP | Refills: 0 | Status: SHIPPED | OUTPATIENT
Start: 2021-03-21 | End: 2021-03-24

## 2021-03-21 RX ADMIN — ZINC SULFATE 220 MG (50 MG) CAPSULE 1 CAPSULE: CAPSULE at 08:17

## 2021-03-21 RX ADMIN — ENOXAPARIN SODIUM 40 MG: 40 INJECTION SUBCUTANEOUS at 08:17

## 2021-03-21 RX ADMIN — Medication 10 ML: at 06:00

## 2021-03-21 RX ADMIN — CEFTRIAXONE 1 G: 1 INJECTION, POWDER, FOR SOLUTION INTRAMUSCULAR; INTRAVENOUS at 08:16

## 2021-03-21 RX ADMIN — OXYCODONE HYDROCHLORIDE AND ACETAMINOPHEN 500 MG: 500 TABLET ORAL at 08:16

## 2021-03-21 RX ADMIN — DEXAMETHASONE SODIUM PHOSPHATE 6 MG: 4 INJECTION, SOLUTION INTRAMUSCULAR; INTRAVENOUS at 01:19

## 2021-03-21 RX ADMIN — AZITHROMYCIN MONOHYDRATE 500 MG: 500 INJECTION, POWDER, LYOPHILIZED, FOR SOLUTION INTRAVENOUS at 11:59

## 2021-03-21 RX ADMIN — REMDESIVIR 100 MG: 100 INJECTION, POWDER, LYOPHILIZED, FOR SOLUTION INTRAVENOUS at 09:15

## 2021-03-21 RX ADMIN — Medication 2000 UNITS: at 08:16

## 2021-03-21 NOTE — PROGRESS NOTES
Bedside and Verbal shift change report given to Herminia (oncoming nurse) by Clyde Herrera (offgoing nurse). Report included the following information SBAR, Kardex, ED Summary, OR Summary, Procedure Summary, MAR and Recent Results.

## 2021-03-21 NOTE — DISCHARGE SUMMARY
Hospitalist Discharge Summary     Patient ID:  Susanne Townsend  [id number]  49 y.o.  1965  3/18/2021    PCP on record: Javed Joya MD    Admit date: 3/18/2021  Discharge date and time: 3/21/2021    DISCHARGE DIAGNOSIS:  Acute hypoxic respiratory failure, POA due to  Bilateral covid pneumonia, POA  Acute hepatitis due to covid-19 infection  Syncope x 2 due to orthostatic hypotension  Persistent asymptomatic orthostatic hypotension   BETHANY, POA Cr 1.39 and hyponatremia due to dehydration      CONSULTATIONS:  IP CONSULT TO HOSPITALIST    Excerpted HPI from H&P of Angelica Stanford MD:  CHIEF COMPLAINT:  syncope     HISTORY OF PRESENT ILLNESS:     Susanne Townsend is a 49 y.o. male without significant PMHx, presented after syncope last evening. Felt lightheaded and dizzy prior to passing out. Diagnosed with covid on 3/2/21 along with his wife. Initially had cough, chest congestion, headache, fever. Been taking tylenol alternating with Motrin ATC for fever. For past week been having increasing SOB with exertion. Had 2 more episodes of syncope within past 5 days. +decreased appetite and nausea, no vomiting or diarrhea.       In ER, febrile 100.8, pulse 90, RR mid 20 to 30s. Given 2L IVF but orthostatic down to 67/42 still. We were asked to admit for work up and evaluation of the above problems.      History reviewed. No pertinent past medical history. ______________________________________________________________________  DISCHARGE SUMMARY/HOSPITAL COURSE:  for full details see H&P, daily progress notes, labs, consult notes.      Acute hypoxic respiratory failure, POA due to  Bilateral covid pneumonia, POA  Acute hepatitis due to covid-19 infection  --CTA chest negative for PE, + b/l pneumonia R>L.  Rapid covid +.  Tested + outside on 3/2/21 but now with increasing XIONG  --86, 89% RA at times at rest.  Put on 2L O2 on admission,  now on RA  Oxygen challenge showed that he doesn't need 02  --continue decadron 6mg x 10 days total  -s/p azithromycin and  rocephin   -s/p remdesivir 4days     ALT 59 but less than 5N . bili wnl . Patient will need repeat CMP as outpatient in a week   --vitamin C, D, zinc for 1 week  Fu inflammatory marks:  LDH, ferritin, fibrinogen, crp, d-dimer    elevated D dimer   D dimer increased from 2.36 to 9.95, pt is not SOB , not hypoxic   lovenox increased to 0.5mg bid and patient discharged on eliquis 2.5mg bid for 30days . He denies any leg swelling or pain   His CTA chest on 03/18 was negative for PE   Repeat D dimer in 2-3days  By PCP     Syncope x 2 due to orthostatic hypotension  BETHANY, POA Cr 1.39 and hyponatremia due to dehydration resolved   Persistent asymptomatic orthostatic hypotension   --s/p IVF - troponin 0.05>0.05  Orthostatics on Discharge  day showed a drop in his bp but patient denied any symptoms . Na and creat back to wnl   As patient is asymptomatic , breathing fine on RA and no SOB upon ambulation , also eager to go home    _______________________________________________________________________  Patient seen and examined by me on discharge day. Pertinent Findings:  Gen:    Not in distress  Chest: Clear lungs  CVS:   Regular rhythm. No edema  Abd:  Soft, not distended, not tender  Neuro:  Alert, orientedx3  _______________________________________________________________________  DISCHARGE MEDICATIONS:   Current Discharge Medication List      START taking these medications    Details   dexAMETHasone (Decadron) 6 mg tablet Take 1 Tab by mouth daily for 6 days. Qty: 6 Tab, Refills: 0      apixaban (Eliquis) 2.5 mg tablet Take 1 Tab by mouth two (2) times a day for 30 days. Qty: 60 Tab, Refills: 0      zinc sulfate (ZINCATE) 50 mg zinc (220 mg) capsule Take 1 Cap by mouth every twelve (12) hours for 3 days. Qty: 6 Cap, Refills: 0      ascorbic acid, vitamin C, (VITAMIN C) 500 mg tablet Take 1 Tab by mouth two (2) times a day for 3 days.   Qty: 6 Tab, Refills: 0         CONTINUE these medications which have NOT CHANGED    Details   acetaminophen (Tylenol Extra Strength) 500 mg tablet Take 500 mg by mouth every six (6) hours as needed for Pain. STOP taking these medications       ibuprofen (Motrin IB) 200 mg tablet Comments:   Reason for Stopping:                 Patient Follow Up Instructions:    Activity: Activity as tolerated  Diet: Regular Diet  Wound Care: None needed    Follow-up with PCP  in  7days   Follow-up tests/labs : repeat CMP in a week     Follow-up Information     Follow up With Specialties Details Why Contact Info    Ree Johnson, [address]   [address]  [phone]          ________________________________________________________________    Risk of deterioration: Low    Condition at Discharge:  Stable  __________________________________________________________________    Disposition  Home with family, no needs    ____________________________________________________________________    Code Status: Full Code  ___________________________________________________________________      Total time in minutes spent coordinating this discharge (includes going over instructions, follow-up, prescriptions, and preparing report for sign off to her PCP) :  >30 minutes    Signed:  Meena Quiñones MD

## 2021-03-21 NOTE — PROGRESS NOTES
Transition of Care Plan:     RUR: 8% - Low  Disposition: Home with Family Assistance  Follow up appointments: PCP  DME needed: None (weaned off of O2)  Transportation at Discharge: Pt's wife  Jose Numbers or means to access home: Pt's wife has keys       Caregiver Contact: Spouse, Jens Hill ([phone])  Discharge Caregiver contacted prior to discharge? Yes    Discharge orders placed. CM called pt via room phone for COVID-19 precautions. No PT/OT recommendations for discharge. Weaned off of O2 per MD's notes. Pt to call and schedule PCP f/u appointment, as offices are closed at this time. Pt awaiting update from MD about discharge, then will contact spouse who lives ~20-30 min away. All information entered into pt AVS.     Pt has no additional CM needs at this time. Care Management Interventions  PCP Verified by CM: Yes(Dr. Ashley Sales - last visit was a year ago)  Palliative Care Criteria Met (RRAT>21 & CHF Dx)?: No  Mode of Transport at Discharge: Other (see comment)(Pt's wife)  Transition of Care Consult (CM Consult): Discharge Planning  Discharge Durable Medical Equipment: No  Health Maintenance Reviewed: Yes  Physical Therapy Consult: No  Occupational Therapy Consult: No  Speech Therapy Consult: No  Current Support Network: Lives with Spouse(Pt lives with his wife in a 2 story home with 4 MANDIE)  Confirm Follow Up Transport: Self  The Plan for Transition of Care is Related to the Following Treatment Goals :  Follow-up Care Appointments  The Patient and/or Patient Representative was Provided with a Choice of Provider and Agrees with the Discharge Plan?: Yes  Name of the Patient Representative Who was Provided with a Choice of Provider and Agrees with the Discharge Plan: Nicole Polanco (pt)  Discharge Location  Discharge Placement: Home with family assistance    Farhat Levy [address] Manager  [phone]

## 2021-03-21 NOTE — DISCHARGE INSTRUCTIONS
DISCHARGE DIAGNOSIS:  Acute hypoxic respiratory failure, POA due to  Bilateral covid pneumonia, POA  Acute hepatitis due to covid-19 infection  Syncope x 2 due to orthostatic hypotension  Persistent asymptomatic orthostatic hypotension   BETHANY, POA Cr 1.39 and hyponatremia due to dehydration    MEDICATIONS:  · It is important that you take the medication exactly as they are prescribed. · Keep your medication in the bottles provided by the pharmacist and keep a list of the medication names, dosages, and times to be taken in your wallet. · Do not take other medications without consulting your doctor. Pain Management: per above medications    What to do at [address]:  Cardiac Diet    Recommended activity: Activity as tolerated    If you have questions regarding the hospital related prescriptions or hospital related issues please call [address] [place] [address] at . You can always direct your questions to your primary care doctor if you are unable to reach your hospital physician; your PCP works as an extension of your hospital doctor just like your hospital doctor is an extension of your PCP for your time at the hospital [facility], [facility]). If you experience any of the following symptoms then please call your primary care physician or return to the emergency room if you cannot get hold of your doctor:  Fever, chills, nausea, vomiting, diarrhea, change in mentation, falling, bleeding, shortness of breath  Advance Care Planning  People with COVID-19 may have no symptoms, mild symptoms, such as fever, cough, and shortness of breath or they may have more severe illness, developing severe and fatal pneumonia. As a result, Advance Care Planning with attention to naming a health care decision maker (someone you trust to make healthcare decisions for you if you could not speak for yourself) and sharing other health care preferences is important BEFORE a possible health crisis.  Please contact your Primary Care Provider to discuss Advance Care Planning. Preventing the Spread of Coronavirus Disease 2019 in Homes and Residential Communities  For the most recent information go to [email or web address]    Prevention steps for People with confirmed or suspected COVID-19 (including persons under investigation) who do not need to be hospitalized  and   People with confirmed COVID-19 who were hospitalized and determined to be medically stable to go home    Your healthcare provider and public health staff will evaluate whether you can be cared for at home. If it is determined that you do not need to be hospitalized and can be isolated at home, you will be monitored by staff from your local or state health department. You should follow the prevention steps below until a healthcare provider or local or state health department says you can return to your normal activities. Stay home except to get medical care  People who are mildly ill with COVID-19 are able to isolate at home during their illness. You should restrict activities outside your home, except for getting medical care. Do not go to work, school, or public areas. Avoid using public transportation, ride-sharing, or taxis. Separate yourself from other people and animals in your home  People: As much as possible, you should stay in a specific room and away from other people in your home. Also, you should use a separate bathroom, if available. Animals: You should restrict contact with pets and other animals while you are sick with COVID-19, just like you would around other people. Although there have not been reports of pets or other animals becoming sick with COVID-19, it is still recommended that people sick with COVID-19 limit contact with animals until more information is known about the virus. When possible, have another member of your household care for your animals while you are sick.  If you are sick with COVID-19, avoid contact with your pet, including petting, snuggling, being kissed or licked, and sharing food. If you must care for your pet or be around animals while you are sick, wash your hands before and after you interact with pets and wear a facemask. Call ahead before visiting your doctor  If you have a medical appointment, call the healthcare provider and tell them that you have or may have COVID-19. This will help the healthcare providers office take steps to keep other people from getting infected or exposed. Wear a facemask  You should wear a facemask when you are around other people (e.g., sharing a room or vehicle) or pets and before you enter a healthcare providers office. If you are not able to wear a facemask (for example, because it causes trouble breathing), then people who live with you should not stay in the same room with you, or they should wear a facemask if they enter your room. Cover your coughs and sneezes  Cover your mouth and nose with a tissue when you cough or sneeze. Throw used tissues in a lined trash can. Immediately wash your hands with soap and water for at least 20 seconds or, if soap and water are not available, clean your hands with an alcohol-based hand  that contains at least 60% alcohol. Clean your hands often  Wash your hands often with soap and water for at least 20 seconds, especially after blowing your nose, coughing, or sneezing; going to the bathroom; and before eating or preparing food. If soap and water are not readily available, use an alcohol-based hand  with at least 60% alcohol, covering all surfaces of your hands and rubbing them together until they feel dry. Soap and water are the best option if hands are visibly dirty. Avoid touching your eyes, nose, and mouth with unwashed hands.   Avoid sharing personal household items  You should not share dishes, drinking glasses, cups, eating utensils, towels, or bedding with other people or pets in your home. After using these items, they should be washed thoroughly with soap and water. Clean all high-touch surfaces everyday  High touch surfaces include counters, tabletops, doorknobs, bathroom fixtures, toilets, phones, keyboards, tablets, and bedside tables. Also, clean any surfaces that may have blood, stool, or body fluids on them. Use a household cleaning spray or wipe, according to the label instructions. Labels contain instructions for safe and effective use of the cleaning product including precautions you should take when applying the product, such as wearing gloves and making sure you have good ventilation during use of the product. Monitor your symptoms  Seek prompt medical attention if your illness is worsening (e.g., difficulty breathing). Before seeking care, call your healthcare provider and tell them that you have, or are being evaluated for, COVID-19. Put on a facemask before you enter the facility. These steps will help the healthcare providers office to keep other people in the office or waiting room from getting infected or exposed. Ask your healthcare provider to call the local or On license of [facility] health department. Persons who are placed under active monitoring or facilitated self-monitoring should follow instructions provided by their local health department or occupational health professionals, as appropriate. When working with your local health department check their available hours. If you have a medical emergency and need to call 911, notify the dispatch personnel that you have, or are being evaluated for COVID-19. If possible, put on a facemask before emergency medical services arrive. Discontinuing home isolation  Patients with confirmed COVID-19 should remain under home isolation precautions until the risk of secondary transmission to others is thought to be low.  The decision to discontinue home isolation precautions should be made on a case-by-case basis, in consultation with healthcare providers and state and local health departments.

## 2021-03-22 ENCOUNTER — PATIENT OUTREACH (OUTPATIENT)
Dept: CASE MANAGEMENT | Age: 56
End: 2021-03-22

## 2021-03-22 NOTE — PROGRESS NOTES
Patient contacted regarding NSIFD-32 diagnosis\". Discussed COVID-19 related testing which was available at this time. Test results were positive. Patient informed of results, if available? yes     Care Transition Nurse contacted the family by telephone to perform post discharge assessment. Call within 2 business days of discharge: Yes Verified name and  with family as identifiers. Provided introduction to self, and explanation of the CTN/ACM role, and reason for call due to risk factors for infection and/or exposure to COVID-19. Symptoms reviewed with family who verbalized the following symptoms: no worsening symptoms      Due to no new or worsening symptoms encounter was not routed to provider for escalation. Discussed follow-up appointments. If no appointment was previously scheduled, appointment scheduling offered:  [facility] follow up appointment(s): No future appointments. Non-[facility] follow up appointment(s): urged to call PCP     Advance Care Planning:   Does patient have an Advance Directive:  patient declined education. Patient has following risk factors of: pneumonia. CTN reviewed discharge instructions, medical action plan and red flags such as increased shortness of breath, increasing fever and signs of decompensation with family who verbalized understanding. Discussed exposure protocols and quarantine with CDC Guidelines What to do if you are sick with coronavirus disease 2019.  Family was given an opportunity for questions and concerns. The family agrees to contact the [facility] exposure line [phone], [facility] [address]  ([phone] and PCP office for questions related to their healthcare. CTN provided contact information for future needs. Reviewed and educated family on any new and changed medications related to discharge diagnosis     Was patient discharged with a pulse oximeter?  no   Patient/family/caregiver given information for [employer] and agrees to enroll no  14 day call based on severity of symptoms and risk factors.

## 2021-03-22 NOTE — PROGRESS NOTES
Physician Progress Note      PATIENT:               Dona Bloom  CSN #:                  [id number]  :                       1965  ADMIT DATE:       3/18/2021 2:53 AM  DISCH DATE:        3/21/2021 3:36 PM  RESPONDING  PROVIDER #:        Milo Kwon MD          QUERY TEXT:    Dear Attending,  Patient admitted with PNA due to COVID 23  Noted documentation of Acute hypoxic respiratory failure in admission H&P. In order to support the diagnosis of acute respiratory failure, please include additional clinical indicators in your documentation. Or please document if the diagnosis of acute respiratory failure has been ruled out after further study. The medical record reflects the following:  Risk Factors: 49 yr. old male admitted with PNA due to COVID 19. Clinical Indicators: SpO2 86%, 89% RA at times at rest.  Reporting increased dyspnea, hypotensive in the ED. Tachypnea up to 39. No increased work of breathing noted. Treatment: Nasal O2 @ 2L, IV fluids, IV steroids, lab work, monitoring and evaluation    Acute Respiratory Failure Clinical Indicators per [employer] Training Guide and Quick Reference Guide:  pO2 < 60 mmHg or SpO2 (pulse oximetry) < 91% breathing room air  pCO2 > 50 and pH < 7.35  P/F ratio (pO2 / FIO2) < 300  pO2 decrease or pCO2 increase by 10 mmHg from baseline (if known)  Supplemental oxygen of 40% or more  Presence of respiratory distress, tachypnea, dyspnea, shortness of breath, wheezing  Unable to speak in complete sentences  Use of accessory muscles to breathe  Extreme anxiety and feeling of impending doom  Tripod position  Confusion/altered mental status/obtunded  Options provided:  -- Acute Hypoxic Respiratory Failure as evidenced by, Please document evidence.   -- Acute Hypoxic Respiratory Failure ruled out after study  -- Other - I will add my own diagnosis  -- Disagree - Not applicable / Not valid  -- Disagree - Clinically unable to determine / Unknown  -- Refer to Clinical Documentation Reviewer    PROVIDER RESPONSE TEXT:    This patient is in acute hypoxic respiratory failure as evidenced by 89& RA and SDOE requiring 2liter of oxygen    Query created by: Casey Willis on 3/19/2021 4:08 PM      Electronically signed by:  Wallace Barney MD 3/22/2021 7:31 AM

## 2021-03-24 LAB
BACTERIA SPEC CULT: NORMAL
BACTERIA SPEC CULT: NORMAL
SERVICE CMNT-IMP: NORMAL
SERVICE CMNT-IMP: NORMAL

## 2021-04-02 ENCOUNTER — PATIENT OUTREACH (OUTPATIENT)
Dept: CASE MANAGEMENT | Age: 56
End: 2021-04-02

## 2021-04-12 ENCOUNTER — HOSPITAL ENCOUNTER (OUTPATIENT)
Dept: GENERAL RADIOLOGY | Age: 56
Discharge: HOME | End: 2021-04-12
Payer: COMMERCIAL

## 2021-04-12 ENCOUNTER — TRANSCRIBE ORDER (OUTPATIENT)
Dept: REGISTRATION | Age: 56
End: 2021-04-12

## 2021-04-12 DIAGNOSIS — J12.82 PNEUMONIA DUE TO CORONAVIRUS DISEASE 2019 (CODE): ICD-10-CM

## 2021-04-12 DIAGNOSIS — J12.82 PNEUMONIA DUE TO CORONAVIRUS DISEASE 2019 (CODE): Primary | ICD-10-CM

## 2021-04-12 PROCEDURE — 71046 X-RAY EXAM CHEST 2 VIEWS: CPT

## 2021-05-14 ENCOUNTER — HOSPITAL ENCOUNTER (OUTPATIENT)
Dept: GENERAL RADIOLOGY | Age: 56
Discharge: HOME | End: 2021-05-14
Payer: COMMERCIAL

## 2021-05-14 ENCOUNTER — TRANSCRIBE ORDER (OUTPATIENT)
Dept: REGISTRATION | Age: 56
End: 2021-05-14

## 2021-05-14 DIAGNOSIS — J12.82 PNEUMONIA DUE TO CORONAVIRUS DISEASE 2019: ICD-10-CM

## 2021-05-14 DIAGNOSIS — U07.1 PNEUMONIA DUE TO CORONAVIRUS DISEASE 2019: Primary | ICD-10-CM

## 2021-05-14 DIAGNOSIS — U07.1 PNEUMONIA DUE TO CORONAVIRUS DISEASE 2019: ICD-10-CM

## 2021-05-14 DIAGNOSIS — J12.82 PNEUMONIA DUE TO CORONAVIRUS DISEASE 2019: Primary | ICD-10-CM

## 2021-05-14 PROCEDURE — 71046 X-RAY EXAM CHEST 2 VIEWS: CPT

## 2022-03-18 PROBLEM — J12.82 PNEUMONIA DUE TO COVID-19 VIRUS: Status: ACTIVE | Noted: 2021-03-18

## 2022-03-18 PROBLEM — R55 SYNCOPE: Status: ACTIVE | Noted: 2021-03-18

## 2022-03-18 PROBLEM — U07.1 PNEUMONIA DUE TO COVID-19 VIRUS: Status: ACTIVE | Noted: 2021-03-18

## 2022-03-20 PROBLEM — I95.1 ORTHOSTATIC HYPOTENSION: Status: ACTIVE | Noted: 2021-03-18

## 2023-05-12 RX ORDER — ACETAMINOPHEN 500 MG
TABLET ORAL EVERY 6 HOURS PRN
COMMUNITY